# Patient Record
Sex: MALE | Race: WHITE | NOT HISPANIC OR LATINO | Employment: OTHER | URBAN - METROPOLITAN AREA
[De-identification: names, ages, dates, MRNs, and addresses within clinical notes are randomized per-mention and may not be internally consistent; named-entity substitution may affect disease eponyms.]

---

## 2018-02-12 ENCOUNTER — OFFICE VISIT CONVERTED (OUTPATIENT)
Dept: FAMILY MEDICINE CLINIC | Facility: CLINIC | Age: 38
End: 2018-02-12
Attending: NURSE PRACTITIONER

## 2018-08-16 ENCOUNTER — CONVERSION ENCOUNTER (OUTPATIENT)
Dept: FAMILY MEDICINE CLINIC | Facility: CLINIC | Age: 38
End: 2018-08-16

## 2018-08-16 ENCOUNTER — OFFICE VISIT CONVERTED (OUTPATIENT)
Dept: FAMILY MEDICINE CLINIC | Facility: CLINIC | Age: 38
End: 2018-08-16
Attending: FAMILY MEDICINE

## 2018-08-24 ENCOUNTER — OFFICE VISIT CONVERTED (OUTPATIENT)
Dept: FAMILY MEDICINE CLINIC | Facility: CLINIC | Age: 38
End: 2018-08-24
Attending: NURSE PRACTITIONER

## 2020-02-05 ENCOUNTER — HOSPITAL ENCOUNTER (OUTPATIENT)
Dept: FAMILY MEDICINE CLINIC | Facility: CLINIC | Age: 40
Discharge: HOME | End: 2020-02-05
Attending: NURSE PRACTITIONER

## 2020-02-05 ENCOUNTER — CONVERSION ENCOUNTER (OUTPATIENT)
Dept: FAMILY MEDICINE CLINIC | Facility: CLINIC | Age: 40
End: 2020-02-05

## 2020-02-05 ENCOUNTER — OFFICE VISIT CONVERTED (OUTPATIENT)
Dept: FAMILY MEDICINE CLINIC | Facility: CLINIC | Age: 40
End: 2020-02-05
Attending: NURSE PRACTITIONER

## 2020-02-05 LAB
ALBUMIN SERPL-MCNC: 4.3 G/DL (ref 3.5–5)
ALBUMIN/GLOB SERPL: 1.4 {RATIO} (ref 1.4–2.6)
ALP SERPL-CCNC: 90 U/L (ref 53–128)
ALT SERPL-CCNC: 41 U/L (ref 10–40)
ANION GAP SERPL CALC-SCNC: 19 MMOL/L (ref 8–19)
AST SERPL-CCNC: 22 U/L (ref 15–50)
BASOPHILS # BLD AUTO: 0.07 10*3/UL (ref 0–0.2)
BASOPHILS NFR BLD AUTO: 1.1 % (ref 0–3)
BILIRUB SERPL-MCNC: 0.5 MG/DL (ref 0.2–1.3)
BUN SERPL-MCNC: 8 MG/DL (ref 5–25)
BUN/CREAT SERPL: 8 {RATIO} (ref 6–20)
CALCIUM SERPL-MCNC: 9.9 MG/DL (ref 8.7–10.4)
CHLORIDE SERPL-SCNC: 104 MMOL/L (ref 99–111)
CHOLEST SERPL-MCNC: 207 MG/DL (ref 107–200)
CHOLEST/HDLC SERPL: 5.6 {RATIO} (ref 3–6)
CONV ABS IMM GRAN: 0.02 10*3/UL (ref 0–0.2)
CONV CO2: 23 MMOL/L (ref 22–32)
CONV IMMATURE GRAN: 0.3 % (ref 0–1.8)
CONV TOTAL PROTEIN: 7.4 G/DL (ref 6.3–8.2)
CREAT UR-MCNC: 0.97 MG/DL (ref 0.7–1.2)
DEPRECATED RDW RBC AUTO: 44.2 FL (ref 35.1–43.9)
EOSINOPHIL # BLD AUTO: 0.41 10*3/UL (ref 0–0.7)
EOSINOPHIL # BLD AUTO: 6.4 % (ref 0–7)
ERYTHROCYTE [DISTWIDTH] IN BLOOD BY AUTOMATED COUNT: 13.1 % (ref 11.6–14.4)
FOLATE SERPL-MCNC: 12.7 NG/ML (ref 4.8–20)
GFR SERPLBLD BASED ON 1.73 SQ M-ARVRAT: >60 ML/MIN/{1.73_M2}
GLOBULIN UR ELPH-MCNC: 3.1 G/DL (ref 2–3.5)
GLUCOSE SERPL-MCNC: 105 MG/DL (ref 70–99)
HCT VFR BLD AUTO: 50.5 % (ref 42–52)
HDLC SERPL-MCNC: 37 MG/DL (ref 40–60)
HGB BLD-MCNC: 16.1 G/DL (ref 14–18)
LDLC SERPL CALC-MCNC: 144 MG/DL (ref 70–100)
LYMPHOCYTES # BLD AUTO: 1.91 10*3/UL (ref 1–5)
LYMPHOCYTES NFR BLD AUTO: 29.6 % (ref 20–45)
MCH RBC QN AUTO: 29.3 PG (ref 27–31)
MCHC RBC AUTO-ENTMCNC: 31.9 G/DL (ref 33–37)
MCV RBC AUTO: 92 FL (ref 80–96)
MONOCYTES # BLD AUTO: 0.72 10*3/UL (ref 0.2–1.2)
MONOCYTES NFR BLD AUTO: 11.2 % (ref 3–10)
NEUTROPHILS # BLD AUTO: 3.32 10*3/UL (ref 2–8)
NEUTROPHILS NFR BLD AUTO: 51.4 % (ref 30–85)
NRBC CBCN: 0 % (ref 0–0.7)
OSMOLALITY SERPL CALC.SUM OF ELEC: 291 MOSM/KG (ref 273–304)
PLATELET # BLD AUTO: 281 10*3/UL (ref 130–400)
PMV BLD AUTO: 11.3 FL (ref 9.4–12.4)
POTASSIUM SERPL-SCNC: 4.8 MMOL/L (ref 3.5–5.3)
RBC # BLD AUTO: 5.49 10*6/UL (ref 4.7–6.1)
SODIUM SERPL-SCNC: 141 MMOL/L (ref 135–147)
TESTOST SERPL-MCNC: 317 NG/DL (ref 249–836)
TRIGL SERPL-MCNC: 128 MG/DL (ref 40–150)
TSH SERPL-ACNC: 1.86 M[IU]/L (ref 0.27–4.2)
VIT B12 SERPL-MCNC: 505 PG/ML (ref 211–911)
VLDLC SERPL-MCNC: 26 MG/DL (ref 5–37)
WBC # BLD AUTO: 6.45 10*3/UL (ref 4.8–10.8)

## 2020-03-11 ENCOUNTER — OFFICE VISIT CONVERTED (OUTPATIENT)
Dept: FAMILY MEDICINE CLINIC | Facility: CLINIC | Age: 40
End: 2020-03-11
Attending: NURSE PRACTITIONER

## 2020-09-01 ENCOUNTER — OFFICE VISIT CONVERTED (OUTPATIENT)
Dept: FAMILY MEDICINE CLINIC | Facility: CLINIC | Age: 40
End: 2020-09-01
Attending: NURSE PRACTITIONER

## 2021-02-10 ENCOUNTER — OFFICE VISIT CONVERTED (OUTPATIENT)
Dept: FAMILY MEDICINE CLINIC | Facility: CLINIC | Age: 41
End: 2021-02-10
Attending: NURSE PRACTITIONER

## 2021-02-10 ENCOUNTER — HOSPITAL ENCOUNTER (OUTPATIENT)
Dept: FAMILY MEDICINE CLINIC | Facility: CLINIC | Age: 41
Discharge: HOME | End: 2021-02-10
Attending: NURSE PRACTITIONER

## 2021-02-10 ENCOUNTER — CONVERSION ENCOUNTER (OUTPATIENT)
Dept: FAMILY MEDICINE CLINIC | Facility: CLINIC | Age: 41
End: 2021-02-10

## 2021-02-10 LAB
ALBUMIN SERPL-MCNC: 4.5 G/DL (ref 3.5–5)
ALBUMIN/GLOB SERPL: 1.6 {RATIO} (ref 1.4–2.6)
ALP SERPL-CCNC: 97 U/L (ref 53–128)
ALT SERPL-CCNC: 24 U/L (ref 10–40)
ANION GAP SERPL CALC-SCNC: 16 MMOL/L (ref 8–19)
AST SERPL-CCNC: 18 U/L (ref 15–50)
BASOPHILS # BLD AUTO: 0.06 10*3/UL (ref 0–0.2)
BASOPHILS NFR BLD AUTO: 0.9 % (ref 0–3)
BILIRUB SERPL-MCNC: 0.68 MG/DL (ref 0.2–1.3)
BUN SERPL-MCNC: 12 MG/DL (ref 5–25)
BUN/CREAT SERPL: 14 {RATIO} (ref 6–20)
CALCIUM SERPL-MCNC: 9.4 MG/DL (ref 8.7–10.4)
CHLORIDE SERPL-SCNC: 103 MMOL/L (ref 99–111)
CHOLEST SERPL-MCNC: 219 MG/DL (ref 107–200)
CHOLEST/HDLC SERPL: 4.8 {RATIO} (ref 3–6)
CONV ABS IMM GRAN: 0.02 10*3/UL (ref 0–0.2)
CONV CO2: 25 MMOL/L (ref 22–32)
CONV IMMATURE GRAN: 0.3 % (ref 0–1.8)
CONV TOTAL PROTEIN: 7.4 G/DL (ref 6.3–8.2)
CREAT UR-MCNC: 0.86 MG/DL (ref 0.7–1.2)
DEPRECATED RDW RBC AUTO: 42.5 FL (ref 35.1–43.9)
EOSINOPHIL # BLD AUTO: 0.28 10*3/UL (ref 0–0.7)
EOSINOPHIL # BLD AUTO: 4.1 % (ref 0–7)
ERYTHROCYTE [DISTWIDTH] IN BLOOD BY AUTOMATED COUNT: 13.2 % (ref 11.6–14.4)
FOLATE SERPL-MCNC: 10.4 NG/ML (ref 4.8–20)
GFR SERPLBLD BASED ON 1.73 SQ M-ARVRAT: >60 ML/MIN/{1.73_M2}
GLOBULIN UR ELPH-MCNC: 2.9 G/DL (ref 2–3.5)
GLUCOSE SERPL-MCNC: 106 MG/DL (ref 70–99)
HCT VFR BLD AUTO: 51.6 % (ref 42–52)
HDLC SERPL-MCNC: 46 MG/DL (ref 40–60)
HGB BLD-MCNC: 16.7 G/DL (ref 14–18)
LDLC SERPL CALC-MCNC: 155 MG/DL (ref 70–100)
LYMPHOCYTES # BLD AUTO: 1.69 10*3/UL (ref 1–5)
LYMPHOCYTES NFR BLD AUTO: 24.6 % (ref 20–45)
MCH RBC QN AUTO: 28.7 PG (ref 27–31)
MCHC RBC AUTO-ENTMCNC: 32.4 G/DL (ref 33–37)
MCV RBC AUTO: 88.7 FL (ref 80–96)
MONOCYTES # BLD AUTO: 0.74 10*3/UL (ref 0.2–1.2)
MONOCYTES NFR BLD AUTO: 10.8 % (ref 3–10)
NEUTROPHILS # BLD AUTO: 4.07 10*3/UL (ref 2–8)
NEUTROPHILS NFR BLD AUTO: 59.3 % (ref 30–85)
NRBC CBCN: 0 % (ref 0–0.7)
OSMOLALITY SERPL CALC.SUM OF ELEC: 288 MOSM/KG (ref 273–304)
PLATELET # BLD AUTO: 257 10*3/UL (ref 130–400)
PMV BLD AUTO: 11.4 FL (ref 9.4–12.4)
POTASSIUM SERPL-SCNC: 4.5 MMOL/L (ref 3.5–5.3)
RBC # BLD AUTO: 5.82 10*6/UL (ref 4.7–6.1)
SODIUM SERPL-SCNC: 139 MMOL/L (ref 135–147)
TESTOST SERPL-MCNC: 374 NG/DL (ref 249–836)
TRIGL SERPL-MCNC: 88 MG/DL (ref 40–150)
TSH SERPL-ACNC: 1.92 M[IU]/L (ref 0.27–4.2)
VIT B12 SERPL-MCNC: 543 PG/ML (ref 211–911)
VLDLC SERPL-MCNC: 18 MG/DL (ref 5–37)
WBC # BLD AUTO: 6.86 10*3/UL (ref 4.8–10.8)

## 2021-05-09 VITALS
DIASTOLIC BLOOD PRESSURE: 82 MMHG | TEMPERATURE: 97.5 F | OXYGEN SATURATION: 98 % | BODY MASS INDEX: 31.86 KG/M2 | SYSTOLIC BLOOD PRESSURE: 138 MMHG | WEIGHT: 256.25 LBS | HEART RATE: 99 BPM | HEIGHT: 75 IN

## 2021-05-09 VITALS
OXYGEN SATURATION: 99 % | WEIGHT: 267.5 LBS | HEART RATE: 78 BPM | HEIGHT: 75 IN | SYSTOLIC BLOOD PRESSURE: 120 MMHG | BODY MASS INDEX: 33.26 KG/M2 | TEMPERATURE: 97.4 F | DIASTOLIC BLOOD PRESSURE: 82 MMHG

## 2021-05-09 VITALS
DIASTOLIC BLOOD PRESSURE: 74 MMHG | SYSTOLIC BLOOD PRESSURE: 128 MMHG | HEART RATE: 69 BPM | BODY MASS INDEX: 32.22 KG/M2 | OXYGEN SATURATION: 98 % | HEIGHT: 75 IN | TEMPERATURE: 98.1 F | WEIGHT: 259.12 LBS

## 2021-05-09 VITALS
WEIGHT: 270 LBS | DIASTOLIC BLOOD PRESSURE: 80 MMHG | SYSTOLIC BLOOD PRESSURE: 120 MMHG | TEMPERATURE: 97.2 F | OXYGEN SATURATION: 98 % | HEART RATE: 83 BPM

## 2021-05-09 VITALS
HEART RATE: 100 BPM | DIASTOLIC BLOOD PRESSURE: 80 MMHG | TEMPERATURE: 97.3 F | WEIGHT: 282 LBS | HEIGHT: 75 IN | BODY MASS INDEX: 35.06 KG/M2 | SYSTOLIC BLOOD PRESSURE: 120 MMHG | OXYGEN SATURATION: 100 %

## 2021-05-09 VITALS
TEMPERATURE: 98 F | OXYGEN SATURATION: 98 % | WEIGHT: 271 LBS | DIASTOLIC BLOOD PRESSURE: 84 MMHG | SYSTOLIC BLOOD PRESSURE: 118 MMHG | HEART RATE: 82 BPM

## 2021-05-09 VITALS
DIASTOLIC BLOOD PRESSURE: 90 MMHG | DIASTOLIC BLOOD PRESSURE: 98 MMHG | WEIGHT: 275.37 LBS | HEART RATE: 72 BPM | TEMPERATURE: 97.4 F | SYSTOLIC BLOOD PRESSURE: 140 MMHG | BODY MASS INDEX: 34.24 KG/M2 | HEIGHT: 75 IN | OXYGEN SATURATION: 100 % | SYSTOLIC BLOOD PRESSURE: 138 MMHG

## 2021-12-01 ENCOUNTER — OFFICE VISIT (OUTPATIENT)
Dept: FAMILY MEDICINE CLINIC | Facility: CLINIC | Age: 41
End: 2021-12-01

## 2021-12-01 VITALS
HEIGHT: 76 IN | WEIGHT: 259 LBS | OXYGEN SATURATION: 98 % | TEMPERATURE: 97.3 F | SYSTOLIC BLOOD PRESSURE: 122 MMHG | HEART RATE: 73 BPM | DIASTOLIC BLOOD PRESSURE: 84 MMHG | BODY MASS INDEX: 31.54 KG/M2

## 2021-12-01 DIAGNOSIS — M50.30 DDD (DEGENERATIVE DISC DISEASE), CERVICAL: Primary | ICD-10-CM

## 2021-12-01 DIAGNOSIS — M79.672 LEFT FOOT PAIN: ICD-10-CM

## 2021-12-01 DIAGNOSIS — G89.29 CHRONIC MIDLINE LOW BACK PAIN WITHOUT SCIATICA: ICD-10-CM

## 2021-12-01 DIAGNOSIS — E78.5 DYSLIPIDEMIA: ICD-10-CM

## 2021-12-01 DIAGNOSIS — M54.50 CHRONIC MIDLINE LOW BACK PAIN WITHOUT SCIATICA: ICD-10-CM

## 2021-12-01 DIAGNOSIS — R73.09 ELEVATED GLUCOSE: ICD-10-CM

## 2021-12-01 DIAGNOSIS — J01.40 ACUTE NON-RECURRENT PANSINUSITIS: ICD-10-CM

## 2021-12-01 DIAGNOSIS — Z28.21 INFLUENZA VACCINATION DECLINED: ICD-10-CM

## 2021-12-01 PROBLEM — F41.9 ANXIETY: Status: ACTIVE | Noted: 2021-12-01

## 2021-12-01 PROBLEM — J45.909 ASTHMA: Status: ACTIVE | Noted: 2021-12-01

## 2021-12-01 PROBLEM — F32.A DEPRESSION: Status: ACTIVE | Noted: 2021-12-01

## 2021-12-01 PROBLEM — M19.90 ARTHRITIS: Status: ACTIVE | Noted: 2021-12-01

## 2021-12-01 LAB
ALBUMIN SERPL-MCNC: 4.7 G/DL (ref 3.5–5.2)
ALBUMIN/GLOB SERPL: 1.6 G/DL
ALP SERPL-CCNC: 85 U/L (ref 39–117)
ALT SERPL W P-5'-P-CCNC: 37 U/L (ref 1–41)
ANION GAP SERPL CALCULATED.3IONS-SCNC: 6.4 MMOL/L (ref 5–15)
AST SERPL-CCNC: 25 U/L (ref 1–40)
BILIRUB SERPL-MCNC: 0.6 MG/DL (ref 0–1.2)
BUN SERPL-MCNC: 13 MG/DL (ref 6–20)
BUN/CREAT SERPL: 15.3 (ref 7–25)
CALCIUM SPEC-SCNC: 9.7 MG/DL (ref 8.6–10.5)
CHLORIDE SERPL-SCNC: 105 MMOL/L (ref 98–107)
CHOLEST SERPL-MCNC: 193 MG/DL (ref 0–200)
CO2 SERPL-SCNC: 29.6 MMOL/L (ref 22–29)
CREAT SERPL-MCNC: 0.85 MG/DL (ref 0.76–1.27)
GFR SERPL CREATININE-BSD FRML MDRD: 100 ML/MIN/1.73
GLOBULIN UR ELPH-MCNC: 2.9 GM/DL
GLUCOSE SERPL-MCNC: 100 MG/DL (ref 65–99)
HBA1C MFR BLD: 5.4 % (ref 4.8–5.6)
HDLC SERPL-MCNC: 41 MG/DL (ref 40–60)
LDLC SERPL CALC-MCNC: 128 MG/DL (ref 0–100)
LDLC/HDLC SERPL: 3.05 {RATIO}
POTASSIUM SERPL-SCNC: 5 MMOL/L (ref 3.5–5.2)
PROT SERPL-MCNC: 7.6 G/DL (ref 6–8.5)
SODIUM SERPL-SCNC: 141 MMOL/L (ref 136–145)
TRIGL SERPL-MCNC: 135 MG/DL (ref 0–150)
URATE SERPL-MCNC: 6 MG/DL (ref 3.4–7)
VLDLC SERPL-MCNC: 24 MG/DL (ref 5–40)

## 2021-12-01 PROCEDURE — 99214 OFFICE O/P EST MOD 30 MIN: CPT | Performed by: NURSE PRACTITIONER

## 2021-12-01 PROCEDURE — 84550 ASSAY OF BLOOD/URIC ACID: CPT | Performed by: NURSE PRACTITIONER

## 2021-12-01 PROCEDURE — 80061 LIPID PANEL: CPT | Performed by: NURSE PRACTITIONER

## 2021-12-01 PROCEDURE — 83036 HEMOGLOBIN GLYCOSYLATED A1C: CPT | Performed by: NURSE PRACTITIONER

## 2021-12-01 PROCEDURE — 80053 COMPREHEN METABOLIC PANEL: CPT | Performed by: NURSE PRACTITIONER

## 2021-12-01 RX ORDER — TIZANIDINE 4 MG/1
TABLET ORAL
COMMUNITY
Start: 2021-11-12 | End: 2021-12-01 | Stop reason: SDUPTHER

## 2021-12-01 RX ORDER — METHYLPREDNISOLONE 4 MG/1
TABLET ORAL
Qty: 21 EACH | Refills: 0 | Status: SHIPPED | OUTPATIENT
Start: 2021-12-01

## 2021-12-01 RX ORDER — ALBUTEROL SULFATE 90 UG/1
2 AEROSOL, METERED RESPIRATORY (INHALATION) EVERY 4 HOURS PRN
COMMUNITY

## 2021-12-01 RX ORDER — DOXYCYCLINE HYCLATE 100 MG/1
100 CAPSULE ORAL 2 TIMES DAILY
Qty: 20 CAPSULE | Refills: 0 | Status: SHIPPED | OUTPATIENT
Start: 2021-12-01

## 2021-12-01 RX ORDER — TIZANIDINE 4 MG/1
4 TABLET ORAL NIGHTLY PRN
Qty: 30 TABLET | Refills: 5 | Status: SHIPPED | OUTPATIENT
Start: 2021-12-01

## 2021-12-01 NOTE — PROGRESS NOTES
Venipuncture Blood Specimen Collection  Venipuncture performed in left arm  by Delal Christianson with good hemostasis. Patient tolerated the procedure well without complications.   12/01/21   Della Christianson

## 2021-12-01 NOTE — PROGRESS NOTES
Chief Complaint  Med Refill (medicine refills,having blood pressure issues and his left foot having pains through the middle of it)    Subjective            Nabeel Fraser presents to [facility]  Pt is here today for the med refills on the Zanaflex for the DDD of cervical spine --pt does well no SE no issues--takes this only at night--    Also at last visit pt was having BP issues and then lined out--and normal today--and pt reports if drinks caffeinated drinks the BP will elevate--only drinks decaf coffee now     Hx of the HLD and was taking the fish oil--not been on it for 3 weeks--    GERD: stable off meds--pt sleeps with an HOB elevated     Asthma: stale off meds--     Pt quit smoking approx 13 yrs ago--    ___________________________________________    Declines vaccines today     ____________________________________________    Pt reports left foot with grinding sensation with stepping--and painful at times--then pt reports located at the bottom of foot at the 2nd and 3rd toe base --no injury --started prior to last visit in 2021--and not all the time--but bothering him now--pt rpeorts when it happens goes on for a few days     _____________________________________________    Also sinus infection s/s and tried OTC's and started approx 1.5 weeks ago --declines the COVID test       PHQ-2 Total Score: 0  PHQ-9 Total Score: 0    History reviewed. No pertinent past medical history.    Allergies   Allergen Reactions   • Cefdinir Unknown - High Severity   • Erythromycin Unknown - High Severity   • Penicillins Unknown - High Severity        History reviewed. No pertinent surgical history.     Social History     Tobacco Use   • Smoking status: Former Smoker     Types: Cigarettes     Quit date: 2009     Years since quittin.9   • Smokeless tobacco: Never Used   Vaping Use   • Vaping Use: Never used   Substance Use Topics   • Alcohol use: Not Currently   • Drug use: Defer  "      History reviewed. No pertinent family history.     Health Maintenance Due   Topic Date Due   • ANNUAL PHYSICAL  Never done   • COVID-19 Vaccine (1) Never done   • Pneumococcal Vaccine 0-64 (1 of 2 - PPSV23) Never done   • INFLUENZA VACCINE  08/01/2021   • HEPATITIS C SCREENING  Never done        Current Outpatient Medications on File Prior to Visit   Medication Sig   • albuterol sulfate  (90 Base) MCG/ACT inhaler Inhale 2 puffs Every 4 (Four) Hours As Needed.   • [DISCONTINUED] tiZANidine (ZANAFLEX) 4 MG tablet TAKE 1 TABLET BY MOUTH once DAILY IN THE EVENING AS NEEDED for ddd cervical spine     No current facility-administered medications on file prior to visit.         There is no immunization history on file for this patient.    Review of Systems   Constitutional: Negative for fatigue.   HENT: Positive for congestion and sinus pressure.         This started when traveling for the holidays and now persistent in the sinus' and pressure and pain   Eyes: Negative.    Respiratory: Negative.  Negative for chest tightness, shortness of breath and wheezing.    Cardiovascular: Negative.    Gastrointestinal: Negative for abdominal pain, blood in stool and GERD.   Endocrine: Negative for polydipsia, polyphagia and polyuria.   Genitourinary: Negative for dysuria.   Musculoskeletal: Positive for arthralgias, back pain, myalgias and neck pain.        Chronic and Hx of DDD and uses zanaflex --also reports more recently hand stiffness --and as per HPI   Skin: Negative.    Allergic/Immunologic: Positive for environmental allergies. Negative for food allergies.   Neurological: Positive for headache. Negative for dizziness, tremors, seizures, syncope, light-headedness and numbness.   Hematological: Does not bruise/bleed easily.   Psychiatric/Behavioral: Negative for self-injury, suicidal ideas and depressed mood. The patient is nervous/anxious.         Pt reports had this since childhood and then reports \"just deals " "with it\"         Objective     /84 (BP Location: Right arm, Patient Position: Sitting, Cuff Size: Adult)   Pulse 73   Temp 97.3 °F (36.3 °C) (Temporal)   Ht 193 cm (76\")   Wt 117 kg (259 lb)   SpO2 98%   BMI 31.53 kg/m²       Physical Exam  Vitals and nursing note reviewed.   Constitutional:       Appearance: Normal appearance.   HENT:      Head: Normocephalic.      Right Ear: Tympanic membrane, ear canal and external ear normal.      Left Ear: Tympanic membrane, ear canal and external ear normal.      Nose:      Right Sinus: Maxillary sinus tenderness and frontal sinus tenderness present.      Left Sinus: Maxillary sinus tenderness and frontal sinus tenderness present.      Mouth/Throat:      Mouth: Mucous membranes are moist.      Pharynx: Posterior oropharyngeal erythema present.   Eyes:      Pupils: Pupils are equal, round, and reactive to light.   Cardiovascular:      Rate and Rhythm: Normal rate and regular rhythm.      Heart sounds: Normal heart sounds.   Pulmonary:      Effort: Pulmonary effort is normal.      Breath sounds: Normal breath sounds.   Abdominal:      General: Bowel sounds are normal.      Palpations: Abdomen is soft.      Tenderness: There is no abdominal tenderness.   Musculoskeletal:      Cervical back: Normal range of motion and neck supple. Spasms, tenderness, bony tenderness and crepitus present.      Lumbar back: Decreased range of motion.   Feet:      Comments: Left foot--bottom of the foot--at the base of the 2nd and 3rd toes puffy and tenderness to palpation   Skin:     General: Skin is warm and dry.   Neurological:      Mental Status: He is alert and oriented to person, place, and time.   Psychiatric:         Mood and Affect: Mood normal.         Behavior: Behavior normal.         Judgment: Judgment normal.         Result Review :                          Assessment and Plan      Diagnoses and all orders for this visit:    1. DDD (degenerative disc disease), cervical " (Primary)  -     tiZANidine (ZANAFLEX) 4 MG tablet; Take 1 tablet by mouth At Night As Needed for Muscle Spasms.  Dispense: 30 tablet; Refill: 5    2. Chronic midline low back pain without sciatica  -     tiZANidine (ZANAFLEX) 4 MG tablet; Take 1 tablet by mouth At Night As Needed for Muscle Spasms.  Dispense: 30 tablet; Refill: 5    3. Dyslipidemia  -     Lipid Panel    4. Elevated glucose  -     Comprehensive Metabolic Panel  -     Hemoglobin A1c    5. Left foot pain  -     XR Foot 3+ View Left (In Office)  -     Uric Acid    6. Acute non-recurrent pansinusitis  -     methylPREDNISolone (MEDROL) 4 MG dose pack; Take as directed on package instructions.  Dispense: 21 each; Refill: 0  -     doxycycline (VIBRAMYCIN) 100 MG capsule; Take 1 capsule by mouth 2 (Two) Times a Day.  Dispense: 20 capsule; Refill: 0    7. Influenza vaccination declined    Also declines any Covid testing today    And declines pneumonia vaccine        Follow Up     Return in about 6 months (around 6/1/2022), or if symptoms worsen or fail to improve.

## 2021-12-01 NOTE — PROGRESS NOTES
Please mail letter to patient stating    Nabeel, the x-ray of your left foot was completely normal if the pain persists after using the steroids please let me know and I can refer you to a podiatrist

## 2021-12-02 NOTE — PROGRESS NOTES
Please mail letter to patient stating    Nabeel, your cholesterol levels were far better than they were the last 2 times that we have drawn them the total cholesterol triglyceride levels HDL levels were all in normal range and then the LDL was down to 128 and that is down from 155 the last time and it should be less than 100 when fasting so please continue a low-cholesterol diet and getting some exercise on a daily basis    Your comprehensive panel shows normal glucose normal kidney and liver function test and normal electrolytes and the uric acid level was in normal range -also the hemoglobin A1c was in normal range no signs of prediabetes

## 2023-08-15 ENCOUNTER — OFFICE VISIT (OUTPATIENT)
Dept: GASTROENTEROLOGY | Facility: CLINIC | Age: 43
End: 2023-08-15
Payer: COMMERCIAL

## 2023-08-15 VITALS
WEIGHT: 281.6 LBS | HEIGHT: 77 IN | BODY MASS INDEX: 33.25 KG/M2 | DIASTOLIC BLOOD PRESSURE: 85 MMHG | HEART RATE: 55 BPM | SYSTOLIC BLOOD PRESSURE: 144 MMHG

## 2023-08-15 DIAGNOSIS — K21.9 GASTROESOPHAGEAL REFLUX DISEASE, UNSPECIFIED WHETHER ESOPHAGITIS PRESENT: ICD-10-CM

## 2023-08-15 DIAGNOSIS — R09.89 GLOBUS SENSATION: ICD-10-CM

## 2023-08-15 DIAGNOSIS — R10.12 LEFT UPPER QUADRANT ABDOMINAL PAIN: Primary | ICD-10-CM

## 2023-08-15 DIAGNOSIS — Z80.0 FH: COLON CANCER: ICD-10-CM

## 2023-08-15 DIAGNOSIS — K58.0 IRRITABLE BOWEL SYNDROME WITH DIARRHEA: ICD-10-CM

## 2023-08-15 PROCEDURE — 1159F MED LIST DOCD IN RCRD: CPT | Performed by: NURSE PRACTITIONER

## 2023-08-15 PROCEDURE — 1160F RVW MEDS BY RX/DR IN RCRD: CPT | Performed by: NURSE PRACTITIONER

## 2023-08-15 PROCEDURE — 99204 OFFICE O/P NEW MOD 45 MIN: CPT | Performed by: NURSE PRACTITIONER

## 2023-08-15 RX ORDER — ATENOLOL 25 MG/1
1 TABLET ORAL DAILY
COMMUNITY
Start: 2023-08-11

## 2023-08-15 RX ORDER — TESTOSTERONE CYPIONATE 200 MG/ML
INJECTION, SOLUTION INTRAMUSCULAR
COMMUNITY
Start: 2023-07-11

## 2023-08-15 RX ORDER — GEMFIBROZIL 600 MG/1
TABLET, FILM COATED ORAL
COMMUNITY
Start: 2023-07-11

## 2023-08-15 RX ORDER — POLYETHYLENE GLYCOL 3350, SODIUM CHLORIDE, SODIUM BICARBONATE, POTASSIUM CHLORIDE 420; 11.2; 5.72; 1.48 G/4L; G/4L; G/4L; G/4L
4000 POWDER, FOR SOLUTION ORAL ONCE
Qty: 4000 ML | Refills: 0 | Status: SHIPPED | OUTPATIENT
Start: 2023-08-15 | End: 2023-08-15

## 2023-08-15 NOTE — PROGRESS NOTES
Chief Complaint        Abdominal Pain (LUQ)    History of Present Illness      Nabeel Fraser is a 43 y.o. male who presents to [facility] as a new patient for abdominal pain.    EGD/colonoscopy---In [place] several years ago. Both were normal per patient report. Hx hemorrhoidectomy. Has been treated for IBS in the past.     Reports intermittent issues with LUQ abd pain. He wakes up a lot with acid in his throat. He admits sometimes anything he drinks will bother him. He does have a BM every morning. He will have fecal urgency with loose stools on occasion. He doesn't take anything regularly for the loose stools because then he will get backed up. He denies any rectal bleeding or melena.  He admits he has tried several over-the-counter meds for reflux in the past.  Nothing is ever really seem to work.  Currently he will just drink milk until it helps.    GI family history----Father with colon cancer with METS to the stomach.     He does admit he was a heavy drinker when he was younger. He does not drink now. He does not smoke. Denies any recreational drug use.         Results       Result Review :   The following data was reviewed by: FERNANDA Luu on 08/15/2023                      Past Medical History       Past Medical History:   Diagnosis Date    Asthma     Degenerative disc disease, cervical     Hyperlipidemia     Hypertension        Past Surgical History:   Procedure Laterality Date    COLONOSCOPY      TESTICLE UNDESCENDED REPAIR      UPPER GASTROINTESTINAL ENDOSCOPY           Current Outpatient Medications:     albuterol sulfate  (90 Base) MCG/ACT inhaler, Inhale 2 puffs Every 4 (Four) Hours As Needed., Disp: , Rfl:     atenolol (TENORMIN) 25 MG tablet, Take 1 tablet by mouth Daily., Disp: , Rfl:     gemfibrozil (LOPID) 600 MG tablet, TAKE 1 TABLET BY MOUTH TWICE DAILY, 30 MINUTES BEFORE MORNING AND EVENING MEALS, Disp: , Rfl:     Testosterone  "Cypionate (DEPOTESTOTERONE CYPIONATE) 200 MG/ML injection, inject 0.5 ml INTRAMUSCULARLY once weekly (discard vial once dose is drawn- vial is only intended to be punctured once), Disp: , Rfl:     tiZANidine (ZANAFLEX) 4 MG tablet, Take 1 tablet by mouth At Night As Needed for Muscle Spasms., Disp: 30 tablet, Rfl: 5    polyethylene glycol-electrolytes (Nulytely with Flavor Packs) 420 g solution, Take 4,000 mL by mouth 1 (One) Time for 1 dose., Disp: 4000 mL, Rfl: 0     Allergies   Allergen Reactions    Cefdinir Unknown - High Severity    Erythromycin Unknown - High Severity    Penicillins Unknown - High Severity       Family History   Problem Relation Age of Onset    Stomach cancer Father     Colon cancer Father         Social History     Social History Narrative    Not on file       Objective       Objective     Vital Signs:   /85 (BP Location: Right arm, Patient Position: Sitting, Cuff Size: Adult)   Pulse 55   Ht 195.6 cm (77\")   Wt 128 kg (281 lb 9.6 oz)   BMI 33.39 kg/mý     Body mass index is 33.39 kg/mý.    Review of Systems   Constitutional:  Negative for appetite change, chills, diaphoresis, fatigue, fever and unexpected weight change.   HENT:  Negative for nosebleeds, postnasal drip, sore throat, trouble swallowing and voice change.    Respiratory:  Negative for cough, choking, chest tightness, shortness of breath, wheezing and stridor.    Cardiovascular:  Negative for chest pain, palpitations and leg swelling.   Gastrointestinal:  Positive for abdominal distention, abdominal pain and diarrhea. Negative for anal bleeding, blood in stool, constipation, nausea, rectal pain and vomiting.   Endocrine: Negative for polydipsia, polyphagia and polyuria.   Musculoskeletal:  Negative for gait problem.   Skin:  Negative for rash and wound.   Allergic/Immunologic: Negative for food allergies.   Neurological:  Negative for dizziness, speech difficulty and light-headedness.   Psychiatric/Behavioral:  Negative " for confusion, self-injury, sleep disturbance and suicidal ideas.       Physical Exam  Constitutional:       General: He is not in acute distress.     Appearance: He is well-developed. He is not ill-appearing.   HENT:      Head: Normocephalic.   Eyes:      Pupils: Pupils are equal, round, and reactive to light.   Cardiovascular:      Rate and Rhythm: Normal rate and regular rhythm.      Heart sounds: Normal heart sounds.   Pulmonary:      Effort: Pulmonary effort is normal.      Breath sounds: Normal breath sounds.   Abdominal:      General: Bowel sounds are normal. There is distension.      Palpations: Abdomen is soft. There is no mass.      Tenderness: There is no abdominal tenderness. There is no guarding or rebound.      Hernia: No hernia is present.   Musculoskeletal:         General: Normal range of motion.   Skin:     General: Skin is warm and dry.   Neurological:      Mental Status: He is alert and oriented to person, place, and time.   Psychiatric:         Speech: Speech normal.         Behavior: Behavior normal.         Judgment: Judgment normal.            Assessment & Plan          Assessment and Plan    Diagnoses and all orders for this visit:    1. Left upper quadrant abdominal pain (Primary)  -     Case Request; Standing  -     Follow Anesthesia Guidelines / Protocol; Future  -     Case Request  -     polyethylene glycol-electrolytes (Nulytely with Flavor Packs) 420 g solution; Take 4,000 mL by mouth 1 (One) Time for 1 dose.  Dispense: 4000 mL; Refill: 0    2. Gastroesophageal reflux disease, unspecified whether esophagitis present  -     Case Request; Standing  -     Follow Anesthesia Guidelines / Protocol; Future  -     Case Request  -     polyethylene glycol-electrolytes (Nulytely with Flavor Packs) 420 g solution; Take 4,000 mL by mouth 1 (One) Time for 1 dose.  Dispense: 4000 mL; Refill: 0    3. Globus sensation  -     Case Request; Standing  -     Follow Anesthesia Guidelines / Protocol;  Future  -     Case Request  -     polyethylene glycol-electrolytes (Nulytely with Flavor Packs) 420 g solution; Take 4,000 mL by mouth 1 (One) Time for 1 dose.  Dispense: 4000 mL; Refill: 0    4. Irritable bowel syndrome with diarrhea  -     Case Request; Standing  -     Follow Anesthesia Guidelines / Protocol; Future  -     Case Request  -     polyethylene glycol-electrolytes (Nulytely with Flavor Packs) 420 g solution; Take 4,000 mL by mouth 1 (One) Time for 1 dose.  Dispense: 4000 mL; Refill: 0    5. FH: colon cancer  -     Case Request; Standing  -     Follow Anesthesia Guidelines / Protocol; Future  -     Case Request  -     polyethylene glycol-electrolytes (Nulytely with Flavor Packs) 420 g solution; Take 4,000 mL by mouth 1 (One) Time for 1 dose.  Dispense: 4000 mL; Refill: 0    Other orders  -     Verify NPO; Standing  -     Verify Bowel Prep Was Successful; Standing  -     Give Tap Water Enema If Bowel Prep Insufficient; Standing      Reviewed medical history with him today.  Given his history and current symptoms recommend EGD and colonoscopy with Dr. Funez for further evaluation.  Patient is agreeable to the scopes.  Continue GERD precautions.  Patient to call the office with any issues.  Patient to follow-up with me after his scopes.  Patient is agreeable to the plan.    Surgical Risk and Benefits discussed: Possible risks/complications, benefits, and alternatives to surgical or invasive procedure have been explained to patient and/or legal guardian; risks include bleeding, infection, and perforation. Patient has been evaluated and can tolerate anesthesia and/or sedation. Risks, benefits, and alternatives to anesthesia and sedation have been explained to patient and/or legal guardian.          Follow Up       Follow Up   Return for F/U AFTER PROCEDURE.  Patient was given instructions and counseling regarding his condition or for health maintenance advice. Please see specific information pulled into  the AVS if appropriate.

## 2023-08-16 ENCOUNTER — PATIENT ROUNDING (BHMG ONLY) (OUTPATIENT)
Dept: GASTROENTEROLOGY | Facility: CLINIC | Age: 43
End: 2023-08-16
Payer: COMMERCIAL

## 2023-08-16 NOTE — PROGRESS NOTES
8/16/2023      Hello, may I speak with Nabeel Fraser     My name is Lambert. I am calling from [facility]. I show that you had a recent visit with FERNANDA Auguste.    Before we get started may I verify your date of birth? 1980    I am calling to officially welcome you to our practice and ask about your recent visit. Is this a good time to talk? No I left patient a voicemail.     Tell me about your visit with us. What things went well?    We strive to ensure that we protect your safety and privacy. Is there anything we could have done to improve this during your visit?        We're always looking for ways to make our patients' experiences even better. Do you have recommendations on ways we may improve?    Overall were you satisfied with your first visit to our practice?    I appreciate you taking the time to speak with me today. Is there anything else I can do for you?    I am glad to hear that you had a very good visit and I appreciate you taking the time to provide feedback on this call. We would greatly appreciate you filling out a survey if you receive one in the mail, email or text. This is a great opportunity to provide any additional feedback that you may think of after this call as well.       Thank you, and have a great day.

## 2023-10-21 ENCOUNTER — ANESTHESIA EVENT (OUTPATIENT)
Dept: GASTROENTEROLOGY | Facility: HOSPITAL | Age: 43
End: 2023-10-21
Payer: COMMERCIAL

## 2023-10-23 NOTE — ANESTHESIA PREPROCEDURE EVALUATION
Anesthesia Evaluation     Patient summary reviewed and Nursing notes reviewed   NPO Solid Status: > 8 hours  NPO Liquid Status: > 2 hours           Airway   Mallampati: I  TM distance: >3 FB  Neck ROM: full  No difficulty expected  Dental - normal exam     Pulmonary     breath sounds clear to auscultation  (+) asthma (Used albuterol this am),sleep apnea  Cardiovascular     Rhythm: regular  Rate: normal    (+) hypertension, hyperlipidemia      Neuro/Psych  (+) psychiatric history Depression and Anxiety  GI/Hepatic/Renal/Endo    (+) GERD poorly controlled    Musculoskeletal     (+) back pain (chronic lower back pain/sciatica)  Abdominal    Substance History      OB/GYN          Other   arthritis,                       Anesthesia Plan    ASA 3     general   total IV anesthesia  (Total IV Anesthesia    Patient understands anesthesia not responsible for dental damage.  )  intravenous induction     Anesthetic plan, risks, benefits, and alternatives have been provided, discussed and informed consent has been obtained with: patient and spouse/significant other.    Plan discussed with CRNA.        CODE STATUS:

## 2023-10-24 ENCOUNTER — ANESTHESIA (OUTPATIENT)
Dept: GASTROENTEROLOGY | Facility: HOSPITAL | Age: 43
End: 2023-10-24
Payer: COMMERCIAL

## 2023-10-24 ENCOUNTER — HOSPITAL ENCOUNTER (OUTPATIENT)
Facility: HOSPITAL | Age: 43
Setting detail: HOSPITAL OUTPATIENT SURGERY
Discharge: HOME | End: 2023-10-24
Attending: INTERNAL MEDICINE | Admitting: INTERNAL MEDICINE
Payer: COMMERCIAL

## 2023-10-24 VITALS
HEART RATE: 75 BPM | OXYGEN SATURATION: 97 % | WEIGHT: 280.87 LBS | RESPIRATION RATE: 20 BRPM | HEIGHT: 77 IN | TEMPERATURE: 98.7 F | DIASTOLIC BLOOD PRESSURE: 78 MMHG | BODY MASS INDEX: 33.16 KG/M2 | SYSTOLIC BLOOD PRESSURE: 126 MMHG

## 2023-10-24 DIAGNOSIS — K21.9 GASTROESOPHAGEAL REFLUX DISEASE, UNSPECIFIED WHETHER ESOPHAGITIS PRESENT: ICD-10-CM

## 2023-10-24 DIAGNOSIS — R10.12 LEFT UPPER QUADRANT ABDOMINAL PAIN: ICD-10-CM

## 2023-10-24 DIAGNOSIS — K58.0 IRRITABLE BOWEL SYNDROME WITH DIARRHEA: ICD-10-CM

## 2023-10-24 DIAGNOSIS — Z80.0 FH: COLON CANCER: ICD-10-CM

## 2023-10-24 PROCEDURE — 25810000003 LACTATED RINGERS PER 1000 ML: Performed by: NURSE ANESTHETIST, CERTIFIED REGISTERED

## 2023-10-24 PROCEDURE — 45385 COLONOSCOPY W/LESION REMOVAL: CPT | Performed by: INTERNAL MEDICINE

## 2023-10-24 PROCEDURE — 43239 EGD BIOPSY SINGLE/MULTIPLE: CPT | Performed by: INTERNAL MEDICINE

## 2023-10-24 PROCEDURE — 88305 TISSUE EXAM BY PATHOLOGIST: CPT | Performed by: INTERNAL MEDICINE

## 2023-10-24 PROCEDURE — 25010000002 PROPOFOL 10 MG/ML EMULSION: Performed by: NURSE ANESTHETIST, CERTIFIED REGISTERED

## 2023-10-24 PROCEDURE — 45380 COLONOSCOPY AND BIOPSY: CPT | Performed by: INTERNAL MEDICINE

## 2023-10-24 RX ORDER — LIDOCAINE HYDROCHLORIDE 20 MG/ML
INJECTION, SOLUTION EPIDURAL; INFILTRATION; INTRACAUDAL; PERINEURAL AS NEEDED
Status: DISCONTINUED | OUTPATIENT
Start: 2023-10-24 | End: 2023-10-24 | Stop reason: SURG

## 2023-10-24 RX ORDER — PROPOFOL 10 MG/ML
VIAL (ML) INTRAVENOUS AS NEEDED
Status: DISCONTINUED | OUTPATIENT
Start: 2023-10-24 | End: 2023-10-24 | Stop reason: SURG

## 2023-10-24 RX ORDER — PANTOPRAZOLE SODIUM 20 MG/1
20 TABLET, DELAYED RELEASE ORAL DAILY
Qty: 30 TABLET | Refills: 1 | Status: SHIPPED | OUTPATIENT
Start: 2023-10-24

## 2023-10-24 RX ORDER — SODIUM CHLORIDE, SODIUM LACTATE, POTASSIUM CHLORIDE, CALCIUM CHLORIDE 600; 310; 30; 20 MG/100ML; MG/100ML; MG/100ML; MG/100ML
30 INJECTION, SOLUTION INTRAVENOUS CONTINUOUS
Status: DISCONTINUED | OUTPATIENT
Start: 2023-10-24 | End: 2023-10-24 | Stop reason: HOSPADM

## 2023-10-24 RX ADMIN — LIDOCAINE HYDROCHLORIDE 50 MG: 20 INJECTION, SOLUTION EPIDURAL; INFILTRATION; INTRACAUDAL; PERINEURAL at 09:08

## 2023-10-24 RX ADMIN — PROPOFOL 200 MCG/KG/MIN: 10 INJECTION, EMULSION INTRAVENOUS at 09:08

## 2023-10-24 RX ADMIN — PROPOFOL 200 MG: 10 INJECTION, EMULSION INTRAVENOUS at 09:08

## 2023-10-24 RX ADMIN — PROPOFOL 100 MG: 10 INJECTION, EMULSION INTRAVENOUS at 09:21

## 2023-10-24 RX ADMIN — SODIUM CHLORIDE, POTASSIUM CHLORIDE, SODIUM LACTATE AND CALCIUM CHLORIDE 30 ML/HR: 600; 310; 30; 20 INJECTION, SOLUTION INTRAVENOUS at 08:26

## 2023-10-24 NOTE — H&P
Pre Procedure History & Physical    Chief Complaint:   GERD, globus, LUQ pain, diarrhea    Subjective     HPI:   44 yo M here for eval of GERD, globus, LUQ pain, diarrhea.    Past Medical History:   Past Medical History:   Diagnosis Date    Asthma     Degenerative disc disease, cervical     Hyperlipidemia     Hypertension        Past Surgical History:  Past Surgical History:   Procedure Laterality Date    COLONOSCOPY      TESTICLE UNDESCENDED REPAIR      UPPER GASTROINTESTINAL ENDOSCOPY         Family History:  Family History   Problem Relation Age of Onset    Stomach cancer Father     Colon cancer Father        Social History:   reports that he quit smoking about 14 years ago. His smoking use included cigarettes. He has never used smokeless tobacco. He reports that he does not currently use alcohol. Drug use questions deferred to the physician.    Medications:   Medications Prior to Admission   Medication Sig Dispense Refill Last Dose    albuterol sulfate  (90 Base) MCG/ACT inhaler Inhale 2 puffs Every 4 (Four) Hours As Needed.       atenolol (TENORMIN) 25 MG tablet Take 1 tablet by mouth Daily.       gemfibrozil (LOPID) 600 MG tablet TAKE 1 TABLET BY MOUTH TWICE DAILY, 30 MINUTES BEFORE MORNING AND EVENING MEALS       Testosterone Cypionate (DEPOTESTOTERONE CYPIONATE) 200 MG/ML injection inject 0.5 ml INTRAMUSCULARLY once weekly (discard vial once dose is drawn- vial is only intended to be punctured once)       tiZANidine (ZANAFLEX) 4 MG tablet Take 1 tablet by mouth At Night As Needed for Muscle Spasms. 30 tablet 5        Allergies:  Cefdinir, Erythromycin, and Penicillins    ROS:    Pertinent items are noted in HPI     Objective     Weight 127 kg (280 lb 13.9 oz).    Physical Exam   Constitutional: Pt is oriented to person, place, and time and well-developed, well-nourished, and in no distress.   Mouth/Throat: Oropharynx is clear and moist.   Neck: Normal range of motion.   Cardiovascular: Normal rate,  regular rhythm and normal heart sounds.    Pulmonary/Chest: Effort normal and breath sounds normal.   Abdominal: Soft. Nontender  Skin: Skin is warm and dry.   Psychiatric: Mood, memory, affect and judgment normal.     Assessment & Plan     Diagnosis:  GERD, globus, LUQ pain, diarrhea    Anticipated Surgical Procedure:  EGD/colonoscopy    The risks, benefits, and alternatives of this procedure have been discussed with the patient or the responsible party- the patient understands and agrees to proceed.

## 2023-10-24 NOTE — ANESTHESIA POSTPROCEDURE EVALUATION
Patient: Nabeel Fraser    Procedure Summary       Date: 10/24/23 Room / Location: [facility] ENDOSCOPY 1 / [facility]    Anesthesia Start: 0908 Anesthesia Stop: 0947    Procedures:       ESOPHAGOGASTRODUODENOSCOPY with biopsy      COLONOSCOPY with biopsy and cold snare polypecotmy Diagnosis:       Left upper quadrant abdominal pain      Gastroesophageal reflux disease, unspecified whether esophagitis present      Irritable bowel syndrome with diarrhea      FH: colon cancer      (Left upper quadrant abdominal pain [R10.12])      (Gastroesophageal reflux disease, unspecified whether esophagitis present [K21.9])      (Irritable bowel syndrome with diarrhea [K58.0])      (FH: colon cancer [Z80.0])    Surgeons: Sarah Funez MD Provider: Gladis Thurston CRNA    Anesthesia Type: general ASA Status: 3            Anesthesia Type: general    Vitals  Vitals Value Taken Time   /85 10/24/23 0947   Temp     Pulse 76 10/24/23 0950   Resp     SpO2 96 % 10/24/23 0950   Vitals shown include unfiled device data.        Post Anesthesia Care and Evaluation    Post-procedure mental status: acceptable.  Pain management: satisfactory to patient    Airway patency: patent  Anesthetic complications: No anesthetic complications    Cardiovascular status: acceptable  Respiratory status: acceptable    Comments: Per chart review

## 2023-10-25 ENCOUNTER — HOSPITAL ENCOUNTER (EMERGENCY)
Facility: HOSPITAL | Age: 43
Discharge: HOME | End: 2023-10-25
Attending: EMERGENCY MEDICINE
Payer: COMMERCIAL

## 2023-10-25 VITALS
HEIGHT: 77 IN | SYSTOLIC BLOOD PRESSURE: 135 MMHG | BODY MASS INDEX: 31.21 KG/M2 | HEART RATE: 89 BPM | TEMPERATURE: 98.5 F | RESPIRATION RATE: 18 BRPM | WEIGHT: 264.33 LBS | DIASTOLIC BLOOD PRESSURE: 85 MMHG | OXYGEN SATURATION: 96 %

## 2023-10-25 DIAGNOSIS — R19.5 DARK STOOLS: Primary | ICD-10-CM

## 2023-10-25 LAB
ALBUMIN SERPL-MCNC: 4.4 G/DL (ref 3.5–5.2)
ALBUMIN/GLOB SERPL: 1.4 G/DL
ALP SERPL-CCNC: 72 U/L (ref 39–117)
ALT SERPL W P-5'-P-CCNC: 25 U/L (ref 1–41)
ANION GAP SERPL CALCULATED.3IONS-SCNC: 11.8 MMOL/L (ref 5–15)
AST SERPL-CCNC: 16 U/L (ref 1–40)
BASOPHILS # BLD AUTO: 0.05 10*3/MM3 (ref 0–0.2)
BASOPHILS NFR BLD AUTO: 0.5 % (ref 0–1.5)
BILIRUB SERPL-MCNC: 1 MG/DL (ref 0–1.2)
BUN SERPL-MCNC: 27 MG/DL (ref 6–20)
BUN/CREAT SERPL: 32.5 (ref 7–25)
CALCIUM SPEC-SCNC: 9.7 MG/DL (ref 8.6–10.5)
CHLORIDE SERPL-SCNC: 101 MMOL/L (ref 98–107)
CO2 SERPL-SCNC: 26.2 MMOL/L (ref 22–29)
CREAT SERPL-MCNC: 0.83 MG/DL (ref 0.76–1.27)
CYTO UR: NORMAL
DEPRECATED RDW RBC AUTO: 43.7 FL (ref 37–54)
EGFRCR SERPLBLD CKD-EPI 2021: 112.8 ML/MIN/1.73
EOSINOPHIL # BLD AUTO: 0.25 10*3/MM3 (ref 0–0.4)
EOSINOPHIL NFR BLD AUTO: 2.5 % (ref 0.3–6.2)
ERYTHROCYTE [DISTWIDTH] IN BLOOD BY AUTOMATED COUNT: 13.7 % (ref 12.3–15.4)
GLOBULIN UR ELPH-MCNC: 3.2 GM/DL
GLUCOSE SERPL-MCNC: 103 MG/DL (ref 65–99)
HCT VFR BLD AUTO: 48 % (ref 37.5–51)
HEMOCCULT STL QL IA: POSITIVE
HGB BLD-MCNC: 15.8 G/DL (ref 13–17.7)
HOLD SPECIMEN: NORMAL
HOLD SPECIMEN: NORMAL
IMM GRANULOCYTES # BLD AUTO: 0.04 10*3/MM3 (ref 0–0.05)
IMM GRANULOCYTES NFR BLD AUTO: 0.4 % (ref 0–0.5)
LAB AP CASE REPORT: NORMAL
LAB AP CLINICAL INFORMATION: NORMAL
LYMPHOCYTES # BLD AUTO: 1.82 10*3/MM3 (ref 0.7–3.1)
LYMPHOCYTES NFR BLD AUTO: 18.5 % (ref 19.6–45.3)
MCH RBC QN AUTO: 28.8 PG (ref 26.6–33)
MCHC RBC AUTO-ENTMCNC: 32.9 G/DL (ref 31.5–35.7)
MCV RBC AUTO: 87.6 FL (ref 79–97)
MONOCYTES # BLD AUTO: 1.05 10*3/MM3 (ref 0.1–0.9)
MONOCYTES NFR BLD AUTO: 10.6 % (ref 5–12)
NEUTROPHILS NFR BLD AUTO: 6.65 10*3/MM3 (ref 1.7–7)
NEUTROPHILS NFR BLD AUTO: 67.5 % (ref 42.7–76)
NRBC BLD AUTO-RTO: 0 /100 WBC (ref 0–0.2)
PATH REPORT.FINAL DX SPEC: NORMAL
PATH REPORT.GROSS SPEC: NORMAL
PLATELET # BLD AUTO: 273 10*3/MM3 (ref 140–450)
PMV BLD AUTO: 10.2 FL (ref 6–12)
POTASSIUM SERPL-SCNC: 3.2 MMOL/L (ref 3.5–5.2)
PROT SERPL-MCNC: 7.6 G/DL (ref 6–8.5)
RBC # BLD AUTO: 5.48 10*6/MM3 (ref 4.14–5.8)
SODIUM SERPL-SCNC: 139 MMOL/L (ref 136–145)
WBC NRBC COR # BLD: 9.86 10*3/MM3 (ref 3.4–10.8)
WHOLE BLOOD HOLD COAG: NORMAL
WHOLE BLOOD HOLD SPECIMEN: NORMAL

## 2023-10-25 PROCEDURE — 85025 COMPLETE CBC W/AUTO DIFF WBC: CPT

## 2023-10-25 PROCEDURE — 80053 COMPREHEN METABOLIC PANEL: CPT

## 2023-10-25 PROCEDURE — 82274 ASSAY TEST FOR BLOOD FECAL: CPT

## 2023-10-25 PROCEDURE — 36415 COLL VENOUS BLD VENIPUNCTURE: CPT

## 2023-10-25 PROCEDURE — 99283 EMERGENCY DEPT VISIT LOW MDM: CPT

## 2023-10-25 RX ORDER — SODIUM CHLORIDE 0.9 % (FLUSH) 0.9 %
10 SYRINGE (ML) INJECTION AS NEEDED
Status: DISCONTINUED | OUTPATIENT
Start: 2023-10-25 | End: 2023-10-25 | Stop reason: HOSPADM

## 2023-10-25 NOTE — SIGNIFICANT NOTE
"Patient stated he was having \"black, liquid stools\" since yesterday 10/24. RN during follow up call, advised patient to come to [facility] ED to have an evaluation.   "

## 2023-10-25 NOTE — ED PROVIDER NOTES
"Time: 7:17 PM EDT  Date of encounter:  10/25/2023  Independent Historian/Clinical History and Information was obtained by:   Patient    History is limited by: N/A    Chief Complaint: Dark stools      History of Present Illness:  Patient is a 43 y.o. year old male who presents to the emergency department for evaluation of dark stools.  Patient reports that he had an EGD and colonoscopy yesterday.  States that he woke up today and was continued to have some loose stools.  They were dark in nature.  He does report they were what looked black.  States he did have 1 more episode today and had black stools but it was not having bright red and did not have anything else in the toilet water.  He spoke with the nurse navigator on a follow-up and they recommended he come to the emergency room.  Denies any chest pain.  Patient not on blood thinners.  Did have several polyps removed as well as biopsies.  No other complaints time    HPI    Patient Care Team  Primary Care Provider: Helen Sims APRN    Past Medical History:     Allergies   Allergen Reactions    Cefdinir Unknown - High Severity    Contrast Dye (Echo Or Unknown Ct/Mr) Unknown - High Severity     Skin turns purple    Erythromycin Unknown - High Severity    Penicillins Unknown - High Severity     Past Medical History:   Diagnosis Date    Anesthesia complication     Patient states \"had trouble breathing during previous colonoscopy.\"    Asthma     Degenerative disc disease, cervical     Hyperlipidemia     Hypertension      Past Surgical History:   Procedure Laterality Date    COLONOSCOPY      COLONOSCOPY N/A 10/24/2023    Procedure: COLONOSCOPY with biopsy and cold snare polypecotmy;  Surgeon: Sarah Funez MD;  Location: [facility] ENDOSCOPY;  Service: Gastroenterology;  Laterality: N/A;  colon polyps, diverticulosis    ENDOSCOPY N/A 10/24/2023    Procedure: ESOPHAGOGASTRODUODENOSCOPY with biopsy;  Surgeon: Sarah Funez MD;  Location: [facility] " "ENDOSCOPY;  Service: Gastroenterology;  Laterality: N/A;  esophagitis, hiatal hernia    HEMORRHOIDECTOMY      TESTICLE UNDESCENDED REPAIR      UPPER GASTROINTESTINAL ENDOSCOPY       Family History   Problem Relation Age of Onset    Stomach cancer Father     Colon cancer Father        Home Medications:  Prior to Admission medications    Medication Sig Start Date End Date Taking? Authorizing Provider   albuterol sulfate  (90 Base) MCG/ACT inhaler Inhale 2 puffs Every 4 (Four) Hours As Needed.    Noris Delgado MD   atenolol (TENORMIN) 25 MG tablet Take 1 tablet by mouth Daily. 23   Noris Delgado MD   gemfibrozil (LOPID) 600 MG tablet TAKE 1 TABLET BY MOUTH TWICE DAILY, 30 MINUTES BEFORE MORNING AND EVENING MEALS 23   Noris Delgado MD   pantoprazole (PROTONIX) 20 MG EC tablet Take 1 tablet by mouth Daily. 10/24/23   Sarah Funez MD   Testosterone Cypionate (DEPOTESTOTERONE CYPIONATE) 200 MG/ML injection inject 0.5 ml INTRAMUSCULARLY once weekly (discard vial once dose is drawn- vial is only intended to be punctured once) 23   Noris Delgado MD   tiZANidine (ZANAFLEX) 4 MG tablet Take 1 tablet by mouth At Night As Needed for Muscle Spasms. 21   Hanny Gunter APRN        Social History:   Social History     Tobacco Use    Smoking status: Former     Types: Cigarettes     Quit date: 2009     Years since quittin.8    Smokeless tobacco: Never   Vaping Use    Vaping Use: Never used   Substance Use Topics    Alcohol use: Not Currently    Drug use: Defer         Review of Systems:  Review of Systems     Physical Exam:  /85   Pulse 89   Temp 98.5 °F (36.9 °C) (Oral)   Resp 18   Ht 195.6 cm (77\")   Wt 120 kg (264 lb 5.3 oz)   SpO2 96%   BMI 31.35 kg/m²     Physical Exam  Vitals and nursing note reviewed.   Constitutional:       Appearance: Normal appearance.   HENT:      Head: Normocephalic and atraumatic.   Eyes:      General: No " scleral icterus.  Cardiovascular:      Rate and Rhythm: Normal rate and regular rhythm.   Pulmonary:      Effort: Pulmonary effort is normal.      Breath sounds: Normal breath sounds.   Abdominal:      Palpations: Abdomen is soft.      Tenderness: There is no abdominal tenderness.   Musculoskeletal:         General: Normal range of motion.      Cervical back: Normal range of motion.   Skin:     Findings: No rash.   Neurological:      Mental Status: He is alert.                  Procedures:  Procedures      Medical Decision Making:      Comorbidities that affect care:    Asthma, Hypertension    External Notes reviewed:    Reviewed EGD and colonoscopy reports.      The following orders were placed and all results were independently analyzed by me:  Orders Placed This Encounter   Procedures    [place] Draw    Comprehensive Metabolic Panel    Occult Blood, Fecal By Immunoassay - Stool, Per Rectum    CBC Auto Differential    NPO Diet NPO Type: Strict NPO    Vital Signs    Inpatient Gastroenterology Consult    Pulse Oximetry    Oxygen Therapy- Nasal Cannula; Titrate 1-6 LPM Per SpO2; 90 - 95%    Insert Peripheral IV    CBC & Differential    Green Top (Gel)    Lavender Top    Gold Top - SST    Light Blue Top       Medications Given in the Emergency Department:  Medications   sodium chloride 0.9 % flush 10 mL (has no administration in time range)        ED Course:    ED Course as of 10/25/23 1926   Wed Oct 25, 2023   1922 Spoke with Dr. Lee his stated this is likely old blood.  If he is not on blood thinners and he has no pain.  He is okay for outpatient follow-up.  Should he have new or worsening symptoms or bright red blood he needs to return. [MA]      ED Course User Index  [MA] Dwaine Flores MD       Labs:    Lab Results (last 24 hours)       Procedure Component Value Units Date/Time    CBC & Differential [[id number]]  (Abnormal) Collected: 10/25/23 1716    Specimen: Blood Updated: 10/25/23 1726    Narrative:       The following orders were created for panel order CBC & Differential.  Procedure                               Abnormality         Status                     ---------                               -----------         ------                     CBC Auto Differential[[id number]]        Abnormal            Final result                 Please view results for these tests on the individual orders.    Comprehensive Metabolic Panel [[id number]]  (Abnormal) Collected: 10/25/23 1716    Specimen: Blood Updated: 10/25/23 1751     Glucose 103 mg/dL      BUN 27 mg/dL      Creatinine 0.83 mg/dL      Sodium 139 mmol/L      Potassium 3.2 mmol/L      Chloride 101 mmol/L      CO2 26.2 mmol/L      Calcium 9.7 mg/dL      Total Protein 7.6 g/dL      Albumin 4.4 g/dL      ALT (SGPT) 25 U/L      AST (SGOT) 16 U/L      Alkaline Phosphatase 72 U/L      Total Bilirubin 1.0 mg/dL      Globulin 3.2 gm/dL      A/G Ratio 1.4 g/dL      BUN/Creatinine Ratio 32.5     Anion Gap 11.8 mmol/L      eGFR 112.8 mL/min/1.73     Narrative:      GFR Normal >60  Chronic Kidney Disease <60  Kidney Failure <15      CBC Auto Differential [[id number]]  (Abnormal) Collected: 10/25/23 1716    Specimen: Blood Updated: 10/25/23 1726     WBC 9.86 10*3/mm3      RBC 5.48 10*6/mm3      Hemoglobin 15.8 g/dL      Hematocrit 48.0 %      MCV 87.6 fL      MCH 28.8 pg      MCHC 32.9 g/dL      RDW 13.7 %      RDW-SD 43.7 fl      MPV 10.2 fL      Platelets 273 10*3/mm3      Neutrophil % 67.5 %      Lymphocyte % 18.5 %      Monocyte % 10.6 %      Eosinophil % 2.5 %      Basophil % 0.5 %      Immature Grans % 0.4 %      Neutrophils, Absolute 6.65 10*3/mm3      Lymphocytes, Absolute 1.82 10*3/mm3      Monocytes, Absolute 1.05 10*3/mm3      Eosinophils, Absolute 0.25 10*3/mm3      Basophils, Absolute 0.05 10*3/mm3      Immature Grans, Absolute 0.04 10*3/mm3      nRBC 0.0 /100 WBC     Occult Blood, Fecal By Immunoassay - Stool, Per Rectum [[id number]]  (Abnormal) Collected: 10/25/23  1837    Specimen: Stool from Per Rectum Updated: 10/25/23 1855     Occult Blood, Fecal by Immunoassay Positive             Imaging:    No Radiology Exams Resulted Within Past 24 Hours      Differential Diagnosis and Discussion:    GI Bleeding: Differential diagnosis includes but is not limited to gastritis, gastric ulcer, stress ulcer, duodenitis, Renetta-Negrete tears, esophageal varices, angiodysplasia, aortic enteric fistula, hematologic issues including thrombocytopenia, GI neoplasm, ulcerative colitis, Crohn's disease, diverticulosis, diverticulitis, hemorrhoids, aortic aneurysm, and polyps    All labs were reviewed and interpreted by me.    [facility]           Patient Care Considerations:          Consultants/Shared Management Plan:    Consultant: I have discussed the case with Dr. Blanca who states okay for outpatient follow-up.  Should he have new or worsening symptoms needs return.    Social Determinants of Health:    Patient is independent, reliable, and has access to care.       Disposition and Care Coordination:    Discharged: The patient is suitable and stable for discharge with no need for consideration of observation or admission.    I have explained the patient´s condition, diagnoses and treatment plan based on the information available to me at this time. I have answered questions and addressed any concerns. The patient has a good  understanding of the patient´s diagnosis, condition, and treatment plan as can be expected at this point. The vital signs have been stable. The patient´s condition is stable and appropriate for discharge from the emergency department.      The patient will pursue further outpatient evaluation with the primary care physician or other designated or consulting physician as outlined in the discharge instructions. They are agreeable to this plan of care and follow-up instructions have been explained in detail. The patient has received these instructions in written format and have  expressed an understanding of the discharge instructions. The patient is aware that any significant change in condition or worsening of symptoms should prompt an immediate return to this or the closest emergency department or call to 911.      Final diagnoses:   Dark stools        ED Disposition       ED Disposition   Discharge    Condition   Stable    Comment   --               This medical record created using voice recognition software.             Dwaine Flores MD  10/25/23 1924

## 2023-10-26 ENCOUNTER — TELEPHONE (OUTPATIENT)
Dept: GASTROENTEROLOGY | Facility: CLINIC | Age: 43
End: 2023-10-26
Payer: COMMERCIAL

## 2023-10-26 NOTE — TELEPHONE ENCOUNTER
----- Message from FERNANDA Luu sent at 10/25/2023  2:49 PM EDT -----  Biopsies are consistent with reflux esophagitis.  Continue current PPI therapy and schedule for follow-up.  Colon biopsies benign.  Recall colonoscopy in 5 years.  Please send letter to patient and PCP.

## 2023-10-26 NOTE — TELEPHONE ENCOUNTER
Patient notified of results and recommendations.   Follow up appt scheduled 1/3/24.  5y recall placed. Care gap updated. Letter mailed to pt and pcp.

## 2024-01-03 ENCOUNTER — OFFICE VISIT (OUTPATIENT)
Dept: GASTROENTEROLOGY | Facility: CLINIC | Age: 44
End: 2024-01-03
Payer: COMMERCIAL

## 2024-01-03 VITALS
DIASTOLIC BLOOD PRESSURE: 91 MMHG | HEIGHT: 77 IN | HEART RATE: 67 BPM | BODY MASS INDEX: 34.81 KG/M2 | SYSTOLIC BLOOD PRESSURE: 131 MMHG | WEIGHT: 294.8 LBS

## 2024-01-03 DIAGNOSIS — K21.00 GASTROESOPHAGEAL REFLUX DISEASE WITH ESOPHAGITIS WITHOUT HEMORRHAGE: ICD-10-CM

## 2024-01-03 DIAGNOSIS — R10.12 LEFT UPPER QUADRANT ABDOMINAL PAIN: Primary | ICD-10-CM

## 2024-01-03 DIAGNOSIS — K44.9 HIATAL HERNIA: ICD-10-CM

## 2024-01-03 DIAGNOSIS — K59.00 CONSTIPATION, UNSPECIFIED CONSTIPATION TYPE: ICD-10-CM

## 2024-01-03 PROCEDURE — 1160F RVW MEDS BY RX/DR IN RCRD: CPT | Performed by: NURSE PRACTITIONER

## 2024-01-03 PROCEDURE — 1159F MED LIST DOCD IN RCRD: CPT | Performed by: NURSE PRACTITIONER

## 2024-01-03 PROCEDURE — 99214 OFFICE O/P EST MOD 30 MIN: CPT | Performed by: NURSE PRACTITIONER

## 2024-01-03 RX ORDER — SILDENAFIL 25 MG/1
TABLET, FILM COATED ORAL
COMMUNITY
Start: 2023-11-06

## 2024-01-03 RX ORDER — LANSOPRAZOLE 30 MG/1
30 CAPSULE, DELAYED RELEASE ORAL 2 TIMES DAILY
Qty: 60 CAPSULE | Refills: 0 | Status: SHIPPED | OUTPATIENT
Start: 2024-01-03

## 2024-01-03 RX ORDER — ATENOLOL AND CHLORTHALIDONE TABLET 100; 25 MG/1; MG/1
1 TABLET ORAL DAILY
COMMUNITY
Start: 2023-12-05

## 2024-01-03 RX ORDER — NEEDLES, FILTER 19GX1 1/2"
NEEDLE, DISPOSABLE MISCELLANEOUS
COMMUNITY
Start: 2023-12-04

## 2024-01-03 NOTE — PATIENT INSTRUCTIONS
Continue miralax daily or every other day for now  Start prevacid (lansoprazole) 30 mg twice a day before food or drink    Give me update in 2-4 days

## 2024-01-03 NOTE — PROGRESS NOTES
Chief Complaint   Heartburn and Abdominal Pain (Epigastric )    History of Present Illness       Nabeel Fraser is a 43 y.o. male who presents to [facility] GASTROENTEROLOGY for follow-up for GERD. He was last seen in the office by me 8/15/23.     EGD/colonoscopy---In [place] several years ago. Both were normal per patient report. Hx hemorrhoidectomy. Has been treated for IBS in the past.      Reports intermittent issues with LUQ abd pain. He wakes up a lot with acid in his throat. He admits sometimes anything he drinks will bother him. He does have a BM every morning. He will have fecal urgency with loose stools on occasion. He doesn't take anything regularly for the loose stools because then he will get backed up. He denies any rectal bleeding or melena.  He admits he has tried several over-the-counter meds for reflux in the past.  Nothing is ever really seem to work.  Currently he will just drink milk until it helps.     GI family history----Father with colon cancer with METS to the stomach.      He does admit he was a heavy drinker when he was younger. He does not drink now. He does not smoke. Denies any recreational drug use.       He underwent EGD and colonoscopy with Dr. Funez on 10/20/2023.  EGD showed LA grade a reflux esophagitis with a small hiatal hernia and gastritis.  Colonoscopy showed 3 mm descending colon polyp which was removed, 4 mm sigmoid colon polyp which was removed, 5 mm rectal polyp which was removed.  Mild diverticulosis.  Otherwise normal exam.  Path positive for reflux esophagitis.  Tubular adenoma.  Recall colonoscopy in 5 years.    He admits the protonix really helped his reflux but made the constipation worse. He ended up in the ER at [facility] on 12/29/23 after having worsening LUQ abd pain and hiccups for 6 days. CT scan was negative for any acute process. Now having more Bms but reports they are black. He is taking NSAIDs PRN. Quit taking the  "protonix due to constipation.     Results       Result Review :       CMP          10/25/2023    17:16   CMP   Glucose 103    BUN 27    Creatinine 0.83    EGFR 112.8    Sodium 139    Potassium 3.2    Chloride 101    Calcium 9.7    Total Protein 7.6    Albumin 4.4    Globulin 3.2    Total Bilirubin 1.0    Alkaline Phosphatase 72    AST (SGOT) 16    ALT (SGPT) 25    Albumin/Globulin Ratio 1.4    BUN/Creatinine Ratio 32.5    Anion Gap 11.8      CBC          10/25/2023    17:16   CBC   WBC 9.86    RBC 5.48    Hemoglobin 15.8    Hematocrit 48.0    MCV 87.6    MCH 28.8    MCHC 32.9    RDW 13.7    Platelets 273            Lipase No results found for: \"LIPASE\"  Amylase No results found for: \"AMYLASE\"            Past Medical History       Past Medical History:   Diagnosis Date    Anesthesia complication     Patient states \"had trouble breathing during previous colonoscopy.\"    Asthma     Degenerative disc disease, cervical     Hyperlipidemia     Hypertension        Past Surgical History:   Procedure Laterality Date    COLONOSCOPY      COLONOSCOPY N/A 10/24/2023    Procedure: COLONOSCOPY with biopsy and cold snare polypecotmy;  Surgeon: Sarah Funez MD;  Location: [facility] ENDOSCOPY;  Service: Gastroenterology;  Laterality: N/A;  colon polyps, diverticulosis    ENDOSCOPY N/A 10/24/2023    Procedure: ESOPHAGOGASTRODUODENOSCOPY with biopsy;  Surgeon: Sarah Funez MD;  Location: [facility] ENDOSCOPY;  Service: Gastroenterology;  Laterality: N/A;  esophagitis, hiatal hernia    HEMORRHOIDECTOMY      TESTICLE UNDESCENDED REPAIR      UPPER GASTROINTESTINAL ENDOSCOPY           Current Outpatient Medications:     albuterol sulfate  (90 Base) MCG/ACT inhaler, Inhale 2 puffs Every 4 (Four) Hours As Needed., Disp: , Rfl:     atenolol-chlorthalidone (TENORETIC) 100-25 MG per tablet, Take 1 tablet by mouth Daily., Disp: , Rfl:     BD Integra Syringe 25G X 1\" 3 ML misc, USE AS DIRECTED INTRAMUSCULARLY with " "testosterone once weekly, Disp: , Rfl:     gemfibrozil (LOPID) 600 MG tablet, TAKE 1 TABLET BY MOUTH TWICE DAILY, 30 MINUTES BEFORE MORNING AND EVENING MEALS, Disp: , Rfl:     sildenafil (VIAGRA) 25 MG tablet, TAKE 1 TO 4 TABLETS BY MOUTH 1/2 HOUR TO 4 HOURS BEFORE SEXUAL ACTIVITY AS NEEDED, Disp: , Rfl:     Testosterone Cypionate (DEPOTESTOTERONE CYPIONATE) 200 MG/ML injection, inject 0.5 ml INTRAMUSCULARLY once weekly (discard vial once dose is drawn- vial is only intended to be punctured once), Disp: , Rfl:     tiZANidine (ZANAFLEX) 4 MG tablet, Take 1 tablet by mouth At Night As Needed for Muscle Spasms., Disp: 30 tablet, Rfl: 5    lansoprazole (PREVACID) 30 MG capsule, Take 1 capsule by mouth 2 (Two) Times a Day., Disp: 60 capsule, Rfl: 0     Allergies   Allergen Reactions    Cefdinir Unknown - High Severity    Contrast Dye (Echo Or Unknown Ct/Mr) Unknown - High Severity     Skin turns purple    Erythromycin Unknown - High Severity    Penicillins Unknown - High Severity       Family History   Problem Relation Age of Onset    Stomach cancer Father     Colon cancer Father         Social History     Social History Narrative    Not on file       Objective       Review of Systems   Constitutional:  Negative for appetite change, fatigue, fever, unexpected weight gain and unexpected weight loss.   HENT:  Negative for trouble swallowing.    Respiratory:  Negative for cough, choking, chest tightness, shortness of breath, wheezing and stridor.    Cardiovascular:  Negative for chest pain, palpitations and leg swelling.   Gastrointestinal:  Positive for abdominal distention, abdominal pain, constipation, nausea, GERD and indigestion. Negative for anal bleeding, blood in stool, diarrhea, rectal pain and vomiting.        Vital Signs:   /91 (BP Location: Left arm, Patient Position: Sitting, Cuff Size: Adult)   Pulse 67   Ht 195.6 cm (77\")   Wt 134 kg (294 lb 12.8 oz)   BMI 34.96 kg/m²       Physical " Exam  Constitutional:       General: He is not in acute distress.     Appearance: He is well-developed. He is not ill-appearing.   HENT:      Head: Normocephalic.   Eyes:      Pupils: Pupils are equal, round, and reactive to light.   Cardiovascular:      Rate and Rhythm: Normal rate and regular rhythm.      Heart sounds: Normal heart sounds.   Pulmonary:      Effort: Pulmonary effort is normal.      Breath sounds: Normal breath sounds.   Abdominal:      General: Bowel sounds are normal. There is distension.      Palpations: Abdomen is soft. There is no mass.      Tenderness: There is abdominal tenderness. There is no guarding or rebound.      Hernia: No hernia is present.       Musculoskeletal:         General: Normal range of motion.   Skin:     General: Skin is warm and dry.   Neurological:      Mental Status: He is alert and oriented to person, place, and time.   Psychiatric:         Speech: Speech normal.         Behavior: Behavior normal.         Judgment: Judgment normal.           Assessment & Plan          Assessment and Plan    Diagnoses and all orders for this visit:    1. Left upper quadrant abdominal pain (Primary)    2. Gastroesophageal reflux disease with esophagitis without hemorrhage  -     lansoprazole (PREVACID) 30 MG capsule; Take 1 capsule by mouth 2 (Two) Times a Day.  Dispense: 60 capsule; Refill: 0    3. Hiatal hernia    4. Constipation, unspecified constipation type      Reviewed EGD and colonoscopy results with him today.  Reviewed [facility] ER notes.  CT scan was negative.  Sounds like his gastritis is really flaring up badly and his constipation is worse.  He needs to get back on PPI.  Will trial Prevacid 30 mg twice daily and see how he does.  Would like him to take MiraLAX every day for now to keep his bowels moving better.  Labs were stable at the ER.  Patient to give me an update in 1 week.  Patient to follow-up with me in 1 month.  Patient is agreeable to the  plan.          Follow Up       Follow Up   Return in about 4 weeks (around 1/31/2024) for ABDOMINAL PAIN.  Patient was given instructions and counseling regarding his condition or for health maintenance advice. Please see specific information pulled into the AVS if appropriate.

## 2024-01-08 ENCOUNTER — TELEPHONE (OUTPATIENT)
Dept: GASTROENTEROLOGY | Facility: CLINIC | Age: 44
End: 2024-01-08
Payer: COMMERCIAL

## 2024-01-08 NOTE — TELEPHONE ENCOUNTER
"Pt left voicemail stating he wanted you to be aware he feels \"good\" with rx change, no other info left   "

## 2024-02-15 ENCOUNTER — OFFICE VISIT (OUTPATIENT)
Dept: GASTROENTEROLOGY | Facility: CLINIC | Age: 44
End: 2024-02-15
Payer: COMMERCIAL

## 2024-02-15 VITALS
DIASTOLIC BLOOD PRESSURE: 87 MMHG | HEART RATE: 58 BPM | SYSTOLIC BLOOD PRESSURE: 134 MMHG | WEIGHT: 293.4 LBS | HEIGHT: 77 IN | BODY MASS INDEX: 34.64 KG/M2

## 2024-02-15 DIAGNOSIS — K44.9 HIATAL HERNIA: ICD-10-CM

## 2024-02-15 DIAGNOSIS — R10.12 LEFT UPPER QUADRANT ABDOMINAL PAIN: Primary | ICD-10-CM

## 2024-02-15 DIAGNOSIS — K21.00 GASTROESOPHAGEAL REFLUX DISEASE WITH ESOPHAGITIS WITHOUT HEMORRHAGE: ICD-10-CM

## 2024-02-15 DIAGNOSIS — K59.00 CONSTIPATION, UNSPECIFIED CONSTIPATION TYPE: ICD-10-CM

## 2024-02-15 PROCEDURE — 99214 OFFICE O/P EST MOD 30 MIN: CPT | Performed by: NURSE PRACTITIONER

## 2024-02-15 PROCEDURE — 1160F RVW MEDS BY RX/DR IN RCRD: CPT | Performed by: NURSE PRACTITIONER

## 2024-02-15 PROCEDURE — 1159F MED LIST DOCD IN RCRD: CPT | Performed by: NURSE PRACTITIONER

## 2024-02-15 RX ORDER — LANSOPRAZOLE 30 MG/1
30 CAPSULE, DELAYED RELEASE ORAL 2 TIMES DAILY
Qty: 180 CAPSULE | Refills: 3 | Status: SHIPPED | OUTPATIENT
Start: 2024-02-15

## 2024-06-26 ENCOUNTER — TRANSCRIBE ORDERS (OUTPATIENT)
Dept: ADMINISTRATIVE | Facility: HOSPITAL | Age: 44
End: 2024-06-26
Payer: COMMERCIAL

## 2024-06-26 DIAGNOSIS — E04.1 NONTOXIC UNINODULAR GOITER: Primary | ICD-10-CM

## 2024-07-03 ENCOUNTER — HOSPITAL ENCOUNTER (OUTPATIENT)
Dept: ULTRASOUND IMAGING | Facility: HOSPITAL | Age: 44
Discharge: HOME | End: 2024-07-03
Admitting: NURSE PRACTITIONER
Payer: COMMERCIAL

## 2024-07-03 DIAGNOSIS — E04.1 NONTOXIC UNINODULAR GOITER: ICD-10-CM

## 2024-07-03 PROCEDURE — 76536 US EXAM OF HEAD AND NECK: CPT

## 2024-10-14 ENCOUNTER — OFFICE VISIT (OUTPATIENT)
Dept: GASTROENTEROLOGY | Facility: CLINIC | Age: 44
End: 2024-10-14
Payer: COMMERCIAL

## 2024-10-14 VITALS
HEART RATE: 53 BPM | WEIGHT: 294.8 LBS | BODY MASS INDEX: 34.81 KG/M2 | DIASTOLIC BLOOD PRESSURE: 75 MMHG | HEIGHT: 77 IN | SYSTOLIC BLOOD PRESSURE: 120 MMHG

## 2024-10-14 DIAGNOSIS — R10.12 LEFT UPPER QUADRANT ABDOMINAL PAIN: ICD-10-CM

## 2024-10-14 DIAGNOSIS — K21.00 GASTROESOPHAGEAL REFLUX DISEASE WITH ESOPHAGITIS WITHOUT HEMORRHAGE: Primary | ICD-10-CM

## 2024-10-14 DIAGNOSIS — K59.04 CHRONIC IDIOPATHIC CONSTIPATION: ICD-10-CM

## 2024-10-14 DIAGNOSIS — K44.9 HIATAL HERNIA: ICD-10-CM

## 2024-10-14 PROCEDURE — 99213 OFFICE O/P EST LOW 20 MIN: CPT | Performed by: NURSE PRACTITIONER

## 2024-10-14 PROCEDURE — 1160F RVW MEDS BY RX/DR IN RCRD: CPT | Performed by: NURSE PRACTITIONER

## 2024-10-14 PROCEDURE — 1159F MED LIST DOCD IN RCRD: CPT | Performed by: NURSE PRACTITIONER

## 2024-10-14 RX ORDER — LOSARTAN POTASSIUM 50 MG/1
1 TABLET ORAL DAILY
COMMUNITY
Start: 2024-08-26

## 2024-10-14 NOTE — PROGRESS NOTES
Chief Complaint   Abdominal Pain    History of Present Illness       Nabeel Fraser is a 44 y.o. male who presents to [facility] for follow-up for GERD.  He was last seen in the office by me on 2/15/2024.    EGD/colonoscopy---In [place] several years ago. Both were normal per patient report. Hx hemorrhoidectomy. Has been treated for IBS in the past.      Reports intermittent issues with LUQ abd pain. He wakes up a lot with acid in his throat. He admits sometimes anything he drinks will bother him. He does have a BM every morning. He will have fecal urgency with loose stools on occasion. He doesn't take anything regularly for the loose stools because then he will get backed up. He denies any rectal bleeding or melena.  He admits he has tried several over-the-counter meds for reflux in the past.  Nothing is ever really seem to work.  Currently he will just drink milk until it helps.     GI family history----Father with colon cancer with METS to the stomach.      He does admit he was a heavy drinker when he was younger. He does not drink now. He does not smoke. Denies any recreational drug use.         He underwent EGD and colonoscopy with Dr. Funez on 10/20/2023.  EGD showed LA grade a reflux esophagitis with a small hiatal hernia and gastritis.  Colonoscopy showed 3 mm descending colon polyp which was removed, 4 mm sigmoid colon polyp which was removed, 5 mm rectal polyp which was removed.  Mild diverticulosis.  Otherwise normal exam.  Path positive for reflux esophagitis.  Tubular adenoma.  Recall colonoscopy in 5 years.     Doing well with prevacid 30 mg daily. Denies any breakthrough reflux. Bowels doing well as long as he avoids cheese. No longer needing miralax.   Results       Result Review :       CMP          10/25/2023    17:16   CMP   Glucose 103    BUN 27    Creatinine 0.83    EGFR 112.8    Sodium 139    Potassium 3.2    Chloride 101    Calcium 9.7    Total  "Protein 7.6    Albumin 4.4    Globulin 3.2    Total Bilirubin 1.0    Alkaline Phosphatase 72    AST (SGOT) 16    ALT (SGPT) 25    Albumin/Globulin Ratio 1.4    BUN/Creatinine Ratio 32.5    Anion Gap 11.8      CBC          10/25/2023    17:16   CBC   WBC 9.86    RBC 5.48    Hemoglobin 15.8    Hematocrit 48.0    MCV 87.6    MCH 28.8    MCHC 32.9    RDW 13.7    Platelets 273      CBC w/diff          10/25/2023    17:16   CBC w/Diff   WBC 9.86    RBC 5.48    Hemoglobin 15.8    Hematocrit 48.0    MCV 87.6    MCH 28.8    MCHC 32.9    RDW 13.7    Platelets 273    Neutrophil Rel % 67.5    Immature Granulocyte Rel % 0.4    Lymphocyte Rel % 18.5    Monocyte Rel % 10.6    Eosinophil Rel % 2.5    Basophil Rel % 0.5                        Past Medical History       Past Medical History:   Diagnosis Date    Anesthesia complication     Patient states \"had trouble breathing during previous colonoscopy.\"    Asthma     Degenerative disc disease, cervical     Hyperlipidemia     Hypertension        Past Surgical History:   Procedure Laterality Date    COLONOSCOPY      COLONOSCOPY N/A 10/24/2023    Procedure: COLONOSCOPY with biopsy and cold snare polypecotmy;  Surgeon: Sarah Funez MD;  Location: [facility];  Service: Gastroenterology;  Laterality: N/A;  colon polyps, diverticulosis    ENDOSCOPY N/A 10/24/2023    Procedure: ESOPHAGOGASTRODUODENOSCOPY with biopsy;  Surgeon: Sarah Funez MD;  Location: [facility];  Service: Gastroenterology;  Laterality: N/A;  esophagitis, hiatal hernia    HEMORRHOIDECTOMY      TESTICLE UNDESCENDED REPAIR      UPPER GASTROINTESTINAL ENDOSCOPY           Current Outpatient Medications:     albuterol sulfate  (90 Base) MCG/ACT inhaler, Inhale 2 puffs Every 4 (Four) Hours As Needed., Disp: , Rfl:     atenolol-chlorthalidone (TENORETIC) 100-25 MG per tablet, Take 1 tablet by mouth Daily., Disp: , Rfl:     BD Integra Syringe 25G X 1\" 3 ML misc, USE AS DIRECTED " "INTRAMUSCULARLY with testosterone once weekly, Disp: , Rfl:     gemfibrozil (LOPID) 600 MG tablet, TAKE 1 TABLET BY MOUTH TWICE DAILY, 30 MINUTES BEFORE MORNING AND EVENING MEALS, Disp: , Rfl:     lansoprazole (PREVACID) 30 MG capsule, Take 1 capsule by mouth 2 (Two) Times a Day., Disp: 180 capsule, Rfl: 3    losartan (COZAAR) 50 MG tablet, Take 1 tablet by mouth Daily., Disp: , Rfl:     sildenafil (VIAGRA) 25 MG tablet, TAKE 1 TO 4 TABLETS BY MOUTH 1/2 HOUR TO 4 HOURS BEFORE SEXUAL ACTIVITY AS NEEDED, Disp: , Rfl:     Testosterone Cypionate (DEPOTESTOTERONE CYPIONATE) 200 MG/ML injection, inject 0.5 ml INTRAMUSCULARLY once weekly (discard vial once dose is drawn- vial is only intended to be punctured once), Disp: , Rfl:     tiZANidine (ZANAFLEX) 4 MG tablet, Take 1 tablet by mouth At Night As Needed for Muscle Spasms., Disp: 30 tablet, Rfl: 5     Allergies   Allergen Reactions    Cefdinir Unknown - High Severity    Contrast Dye (Echo Or Unknown Ct/Mr) Unknown - High Severity     Skin turns purple    Erythromycin Unknown - High Severity    Penicillins Unknown - High Severity       Family History   Problem Relation Age of Onset    Stomach cancer Father     Colon cancer Father         Social History     Social History Narrative    Not on file       Objective       Review of Systems   Constitutional:  Negative for fatigue, fever, unexpected weight gain and unexpected weight loss.   HENT:  Negative for trouble swallowing.    Respiratory:  Negative for cough, choking, chest tightness, shortness of breath, wheezing and stridor.    Cardiovascular:  Negative for chest pain, palpitations and leg swelling.   Gastrointestinal:  Negative for abdominal distention, abdominal pain, anal bleeding, blood in stool, constipation, diarrhea, nausea, rectal pain, vomiting, GERD and indigestion.        Vital Signs:   /75 (BP Location: Right arm, Patient Position: Sitting, Cuff Size: Adult)   Pulse 53   Ht 195.6 cm (77\")   Wt 134 " kg (294 lb 12.8 oz)   BMI 34.96 kg/m²       Physical Exam  Constitutional:       General: He is not in acute distress.     Appearance: He is well-developed. He is not ill-appearing.   HENT:      Head: Normocephalic.   Eyes:      Pupils: Pupils are equal, round, and reactive to light.   Cardiovascular:      Rate and Rhythm: Normal rate and regular rhythm.      Heart sounds: Normal heart sounds.   Pulmonary:      Effort: Pulmonary effort is normal.      Breath sounds: Normal breath sounds.   Abdominal:      General: Bowel sounds are normal. There is no distension.      Palpations: Abdomen is soft. There is no mass.      Tenderness: There is no abdominal tenderness. There is no guarding or rebound.      Hernia: No hernia is present.   Musculoskeletal:         General: Normal range of motion.   Skin:     General: Skin is warm and dry.   Neurological:      Mental Status: He is alert and oriented to person, place, and time.   Psychiatric:         Speech: Speech normal.         Behavior: Behavior normal.         Judgment: Judgment normal.           Assessment & Plan          Assessment and Plan    Diagnoses and all orders for this visit:    1. Gastroesophageal reflux disease with esophagitis without hemorrhage (Primary)    2. Chronic idiopathic constipation    3. Left upper quadrant abdominal pain    4. Hiatal hernia    GERD seems well-controlled on Prevacid 30 mg daily.  Continue GERD precautions.  Bowels moving well as long as he avoids dairy.  Overall he seems to be doing really well.  Patient to call the office with any issues.  Patient to follow-up with me in 1 year.  Patient is agreeable to the plan.            Follow Up       Follow Up   Return in about 1 year (around 10/14/2025) for GERD, CONSTIPATION.  Patient was given instructions and counseling regarding his condition or for health maintenance advice. Please see specific information pulled into the AVS if appropriate.

## 2025-01-24 NOTE — PROGRESS NOTES
Chief Complaint: chronic prostatitis and new patient    Subjective         History of Present Illness  Nabeel Fraser is a 44 y.o. male presents to [facility] to be seen for prostatitis.    He reports to the office today with complaints of left sided abdominal and left flank pain.  He first noticed the pain about 4-5 years ago.  He reports that at first he only had the pain if he drank a lot of dark colored sodas.  Over the last two years he started having more pain.  He decided to cut back on sodas and this has helped with the pain.  He reports that he has seen GI and had multiple different tests and there was not identified cause for his pain.      He reports that he does have some occasional burning with urination.  He recently switched from drinking mainly soda to mostly water and this has helped this.  He does have some mild burning with urination today, but did drink two sodas yesterday.  He does also report perineal pain and feels like he is sitting on a ball at times.  His PCP started him on tamsulosin 3 months ago and this has helped some with his urinary symptoms.  He reports that when he stops taking the medication that pain he has in his abdomen and flank seems to worsen.  He feels like he is emptying his bladder completely.       He has not been treated for a prostatitis.  He reports that he had a CT scan of his abdomen done about a year ago.  His PCP is monitoring his PSA.    He has noticed that he has had issues with ED over the last two years.  He has issues obtaining and maintaining an erection.  He tried sildenafil, but that caused itching of the throat and chest area so he stopped that medication.  He is not interested in any other treatments for ED at this time as he would like to sort through his other issues before he even thinks about treating his ED.    Frequency-denies     Urgency-admits     Incontinence-admits     Nocturia-admits, 1-4 X per night    Dysuria-admits  "    Perineal pain-admits, sometimes has pain, sometimes feels like he is sitting on a ball    Stream-normal, was weak before starting tamsulosin     GH-denies     History of stones-denies      surgeries-denies     Family history of  malignancy-prostate cancer-father-diagnosed around age 45, bladder cancer-father-diagnosed early 30's     Cardiopulmonary-HTN, asthma    Anticoagulants-denies     Smoker-denies     PSA  11/21/2024 0.6    Objective     Past Medical History:   Diagnosis Date    Anesthesia complication     Patient states \"had trouble breathing during previous colonoscopy.\"    Asthma     Degenerative disc disease, cervical     Hyperlipidemia     Hypertension        Past Surgical History:   Procedure Laterality Date    COLONOSCOPY      COLONOSCOPY N/A 10/24/2023    Procedure: COLONOSCOPY with biopsy and cold snare polypecotmy;  Surgeon: Sarah Funez MD;  Location: [facility];  Service: Gastroenterology;  Laterality: N/A;  colon polyps, diverticulosis    ENDOSCOPY N/A 10/24/2023    Procedure: ESOPHAGOGASTRODUODENOSCOPY with biopsy;  Surgeon: Sarah Funez MD;  Location: [facility] END[facility]COPY;  Service: Gastroenterology;  Laterality: N/A;  esophagitis, hiatal hernia    HEMORRHOIDECTOMY      TESTICLE UNDESCENDED REPAIR      UPPER GASTROINTESTINAL ENDOSCOPY           Current Outpatient Medications:     albuterol sulfate  (90 Base) MCG/ACT inhaler, Inhale 2 puffs Every 4 (Four) Hours As Needed., Disp: , Rfl:     atenolol-chlorthalidone (TENORETIC) 100-25 MG per tablet, Take 1 tablet by mouth Daily., Disp: , Rfl:     atorvastatin (LIPITOR) 20 MG tablet, Take 1 tablet by mouth every night at bedtime., Disp: , Rfl:     BD Integra Syringe 25G X 1\" 3 ML misc, USE AS DIRECTED INTRAMUSCULARLY with testosterone once weekly, Disp: , Rfl:     lansoprazole (PREVACID) 30 MG capsule, Take 1 capsule by mouth 2 (Two) Times a Day., Disp: 180 capsule, Rfl: 3    losartan (COZAAR) 50 MG tablet, Take " "1 tablet by mouth Daily., Disp: , Rfl:     tamsulosin (FLOMAX) 0.4 MG capsule 24 hr capsule, TAKE 1 CAPSULE BY MOUTH EVERY DAY 30 MINS AFTER THE SAME MEAL EACH DAY, Disp: , Rfl:     Testosterone Cypionate (DEPOTESTOTERONE CYPIONATE) 200 MG/ML injection, inject 0.5 ml INTRAMUSCULARLY once weekly (discard vial once dose is drawn- vial is only intended to be punctured once), Disp: , Rfl:     tiZANidine (ZANAFLEX) 4 MG tablet, Take 1 tablet by mouth At Night As Needed for Muscle Spasms., Disp: 30 tablet, Rfl: 5    doxycycline (VIBRAMYCIN) 100 MG capsule, Take 1 capsule by mouth 2 (Two) Times a Day for 28 days., Disp: 56 capsule, Rfl: 0    Lactobacillus (Florajen Acidophilus) capsule, Take 1 capsule by mouth Daily., Disp: 28 capsule, Rfl: 0    Allergies   Allergen Reactions    Cefdinir Unknown - High Severity    Contrast Dye (Echo Or Unknown Ct/Mr) Unknown - High Severity     Skin turns purple    Erythromycin Unknown - High Severity    Penicillins Unknown - High Severity        Family History   Problem Relation Age of Onset    Stomach cancer Father     Colon cancer Father        Social History     Socioeconomic History    Marital status:    Tobacco Use    Smoking status: Former     Current packs/day: 0.00     Types: Cigarettes     Quit date: 2009     Years since quittin.0    Smokeless tobacco: Never   Vaping Use    Vaping status: Never Used   Substance and Sexual Activity    Alcohol use: Not Currently    Drug use: Defer    Sexual activity: Not Currently       Vital Signs:   Resp 12   Ht 195.6 cm (77\")   Wt 133 kg (294 lb)   BMI 34.86 kg/m²      Physical Exam  Vitals and nursing note reviewed.   Constitutional:       General: He is not in acute distress.     Appearance: Normal appearance. He is not toxic-appearing.   Pulmonary:      Effort: Pulmonary effort is normal. No respiratory distress.   Neurological:      General: No focal deficit present.      Mental Status: He is alert and oriented to person, " place, and time.          Result Review :   The following data was reviewed by: FERNANDA Ivory on 01/29/2025:       Results for orders placed or performed in visit on 01/29/25   Bladder Scan    Collection Time: 01/29/25  8:52 AM   Result Value Ref Range    Urine Volume 0      Bladder Scan interpretation 01/29/2025    Estimation of residual urine via BVI 3000 [address] Bladder Scan  MA/nurse performing: Robina CADET MA  Residual Urine: 0 ml  Indication: Prostatitis, unspecified prostatitis type    Dysuria    Erectile dysfunction, unspecified erectile dysfunction type   Position: Supine  Examination: Incremental scanning of the suprapubic area using 2.0 MHz transducer using copious amounts of acoustic gel.   Findings: An anechoic area was demonstrated which represented the bladder, with measurement of residual urine as noted. I inspected this myself. In that the residual urine was stable or insignificant, refer to plan for treatment and plan necessary at this time.           Procedures        Assessment and Plan    Diagnoses and all orders for this visit:    1. Prostatitis, unspecified prostatitis type (Primary)  -     Bladder Scan  -     doxycycline (VIBRAMYCIN) 100 MG capsule; Take 1 capsule by mouth 2 (Two) Times a Day for 28 days.  Dispense: 56 capsule; Refill: 0  -     Lactobacillus (Florajen Acidophilus) capsule; Take 1 capsule by mouth Daily.  Dispense: 28 capsule; Refill: 0    2. Dysuria    3. Erectile dysfunction, unspecified erectile dysfunction type    Given the patient's symptoms, I think it makes most sense to go ahead and treat him for a prostatitis with a month-long course of antibiotics.  The patient should continue his tamsulosin.  He should continue to avoid bladder irritants, including sodas.  Will plan to see the patient back in the office in about 2 months to see if his symptoms improve after treatment with antibiotics.  If his symptoms worsen, we can always try to see him sooner.  We can discuss  treatment for ED at his follow-up appointment if he desires.  The patient verbalized understanding and agrees with plan of care.    Follow-up in 2 months.      Follow Up   Return in about 2 months (around 3/29/2025).  Patient was given instructions and counseling regarding his condition or for health maintenance advice. Please see specific information pulled into the AVS if appropriate.         This document has been electronically signed by FERNANDA Ivory  January 29, 2025 12:50 EST

## 2025-01-29 ENCOUNTER — OFFICE VISIT (OUTPATIENT)
Dept: UROLOGY | Age: 45
End: 2025-01-29
Payer: COMMERCIAL

## 2025-01-29 VITALS — HEIGHT: 77 IN | WEIGHT: 294 LBS | RESPIRATION RATE: 12 BRPM | BODY MASS INDEX: 34.71 KG/M2

## 2025-01-29 DIAGNOSIS — R30.0 DYSURIA: ICD-10-CM

## 2025-01-29 DIAGNOSIS — N52.9 ERECTILE DYSFUNCTION, UNSPECIFIED ERECTILE DYSFUNCTION TYPE: ICD-10-CM

## 2025-01-29 DIAGNOSIS — N41.9 PROSTATITIS, UNSPECIFIED PROSTATITIS TYPE: Primary | ICD-10-CM

## 2025-01-29 LAB — URINE VOLUME: 0

## 2025-01-29 RX ORDER — ATORVASTATIN CALCIUM 20 MG/1
20 TABLET, FILM COATED ORAL
COMMUNITY
Start: 2024-11-22

## 2025-01-29 RX ORDER — LACTOBACILLUS ACIDOPHILUS 20B CELL
1 CAPSULE ORAL DAILY
Qty: 28 CAPSULE | Refills: 0 | Status: SHIPPED | OUTPATIENT
Start: 2025-01-29

## 2025-01-29 RX ORDER — DOXYCYCLINE 100 MG/1
100 CAPSULE ORAL 2 TIMES DAILY
Qty: 56 CAPSULE | Refills: 0 | Status: SHIPPED | OUTPATIENT
Start: 2025-01-29 | End: 2025-02-26

## 2025-01-29 RX ORDER — TAMSULOSIN HYDROCHLORIDE 0.4 MG/1
CAPSULE ORAL
COMMUNITY
Start: 2025-01-27

## 2025-01-30 ENCOUNTER — TELEPHONE (OUTPATIENT)
Dept: GASTROENTEROLOGY | Facility: CLINIC | Age: 45
End: 2025-01-30
Payer: COMMERCIAL

## 2025-02-11 ENCOUNTER — TRANSCRIBE ORDERS (OUTPATIENT)
Dept: ADMINISTRATIVE | Facility: HOSPITAL | Age: 45
End: 2025-02-11
Payer: COMMERCIAL

## 2025-02-11 DIAGNOSIS — E04.1 NONTOXIC UNINODULAR GOITER: Primary | ICD-10-CM

## 2025-02-25 DIAGNOSIS — K21.00 GASTROESOPHAGEAL REFLUX DISEASE WITH ESOPHAGITIS WITHOUT HEMORRHAGE: ICD-10-CM

## 2025-02-26 RX ORDER — LANSOPRAZOLE 30 MG/1
30 CAPSULE, DELAYED RELEASE ORAL 2 TIMES DAILY
Qty: 180 CAPSULE | Refills: 3 | Status: SHIPPED | OUTPATIENT
Start: 2025-02-26

## 2025-02-26 NOTE — TELEPHONE ENCOUNTER
Medication Requested Lansoprazole 30mg    Last Refill 2/15/2024    Last OV 10/14/2024    Next OV 10/14/2025    Medication pended for approval and correct pharmacy verified Yes

## 2025-04-09 NOTE — PROGRESS NOTES
"Chief Complaint: Prostatitis, unspecified prostatitis type and Difficulty Urinating (2 mo. F/u)      Subjective         History of Present Illness  Nabeel Fraser is a 44 y.o. male presents to [facility] UROLOGY to be seen for follow-up.    The patient was previously seen in the office on 1/29/2025 for prostatitis, dysuria and erectile dysfunction.  At that visit, the patient was started on a 4-week course of doxycycline.  At that visit the patient reported that he had stopped use of sildenafil because it was causing him to have itching to the throat and chest.  He was not interested in any additional treatment for ED at that time.  The patient is here today to follow-up.    He was only able to take 2 weeks of the doxycyline, because he got the flu and he could not keep down any food or drink so he stopped taking the medicine.  He reports that he did notice that his urinary symptoms seemed to improve while he was on the doxycycline.  Since he has stopped the medicine he has noticed that he is still having to urinate frequently, especially at night.  He reports that he is usually up 2-3 times per night needing to urinate.  He denies any dysuria, but does report pressure\" to the perineal area.  He reports that his erectile dysfunction issues were also improving while he was taking the doxycycline.  He is still taking tamsulosin.      Previous visit 1/29/2025:  Nabeel Fraser is a 44 y.o. male presents to [facility] UROLOGY to be seen for prostatitis.     He reports to the office today with complaints of left sided abdominal and left flank pain.  He first noticed the pain about 4-5 years ago.  He reports that at first he only had the pain if he drank a lot of dark colored sodas.  Over the last two years he started having more pain.  He decided to cut back on sodas and this has helped with the pain.  He reports that he has seen GI and had multiple different tests and there was not " "identified cause for his pain.       He reports that he does have some occasional burning with urination.  He recently switched from drinking mainly soda to mostly water and this has helped this.  He does have some mild burning with urination today, but did drink two sodas yesterday.  He does also report perineal pain and feels like he is sitting on a ball at times.  His PCP started him on tamsulosin 3 months ago and this has helped some with his urinary symptoms.  He reports that when he stops taking the medication that pain he has in his abdomen and flank seems to worsen.  He feels like he is emptying his bladder completely.        He has not been treated for a prostatitis.  He reports that he had a CT scan of his abdomen done about a year ago.  His PCP is monitoring his PSA.     He has noticed that he has had issues with ED over the last two years.  He has issues obtaining and maintaining an erection.  He tried sildenafil, but that caused itching of the throat and chest area so he stopped that medication.  He is not interested in any other treatments for ED at this time as he would like to sort through his other issues before he even thinks about treating his ED.     Frequency-denies      Urgency-admits      Incontinence-admits      Nocturia-admits, 1-4 X per night     Dysuria-admits      Perineal pain-admits, sometimes has pain, sometimes feels like he is sitting on a ball     Stream-normal, was weak before starting tamsulosin      GH-denies      History of stones-denies       surgeries-denies      Family history of  malignancy-prostate cancer-father-diagnosed around age 45, bladder cancer-father-diagnosed early 30's      Cardiopulmonary-HTN, asthma     Anticoagulants-denies      Smoker-denies      PSA  11/21/2024 0.6       Objective     Past Medical History:   Diagnosis Date    Anesthesia complication     Patient states \"had trouble breathing during previous colonoscopy.\"    Asthma     Degenerative disc " disease, cervical     Hyperlipidemia     Hypertension        Past Surgical History:   Procedure Laterality Date    COLONOSCOPY      COLONOSCOPY N/A 10/24/2023    Procedure: COLONOSCOPY with biopsy and cold snare polypecotmy;  Surgeon: Sarah Funez MD;  Location: [facility] ENDOSCOPY;  Service: Gastroenterology;  Laterality: N/A;  colon polyps, diverticulosis    ENDOSCOPY N/A 10/24/2023    Procedure: ESOPHAGOGASTRODUODENOSCOPY with biopsy;  Surgeon: Sarah Funez MD;  Location: [facility] ENDOSCOPY;  Service: Gastroenterology;  Laterality: N/A;  esophagitis, hiatal hernia    HEMORRHOIDECTOMY      TESTICLE UNDESCENDED REPAIR      UPPER GASTROINTESTINAL ENDOSCOPY           Current Outpatient Medications:     albuterol sulfate  (90 Base) MCG/ACT inhaler, Inhale 2 puffs Every 4 (Four) Hours As Needed., Disp: , Rfl:     atenolol-chlorthalidone (TENORETIC) 100-25 MG per tablet, Take 1 tablet by mouth Daily., Disp: , Rfl:     atorvastatin (LIPITOR) 20 MG tablet, Take 1 tablet by mouth every night at bedtime., Disp: , Rfl:     lansoprazole (PREVACID) 30 MG capsule, TAKE 1 CAPSULE BY MOUTH TWICE DAILY, Disp: 180 capsule, Rfl: 3    losartan (COZAAR) 50 MG tablet, Take 1 tablet by mouth Daily., Disp: , Rfl:     tamsulosin (FLOMAX) 0.4 MG capsule 24 hr capsule, TAKE 1 CAPSULE BY MOUTH EVERY DAY 30 MINS AFTER THE SAME MEAL EACH DAY, Disp: , Rfl:     doxycycline (VIBRAMYCIN) 100 MG capsule, Take 1 capsule by mouth 2 (Two) Times a Day for 28 days., Disp: 56 capsule, Rfl: 0    Allergies   Allergen Reactions    Cefdinir Unknown - High Severity    Contrast Dye (Echo Or Unknown Ct/Mr) Unknown - High Severity     Skin turns purple    Erythromycin Unknown - High Severity    Penicillins Unknown - High Severity    Iodinated Contrast Media Other (See Comments)     Pt says he was 13 or 14 when develepoed this allergy        Family History   Problem Relation Age of Onset    Stomach cancer Father     Colon cancer Father      "Multiple sclerosis Father     Heart disease Father     Diabetes Mother     Diabetes Paternal Grandmother     Heart disease Paternal Grandfather        Social History     Socioeconomic History    Marital status:    Tobacco Use    Smoking status: Former     Current packs/day: 0.00     Types: Cigarettes     Quit date: 2009     Years since quittin.2    Smokeless tobacco: Never   Vaping Use    Vaping status: Never Used   Substance and Sexual Activity    Alcohol use: Not Currently    Drug use: Defer    Sexual activity: Not Currently       Vital Signs:   Resp 14   Ht 195.6 cm (77\")   Wt 133 kg (294 lb)   BMI 34.86 kg/m²      Physical Exam  Vitals and nursing note reviewed.   Constitutional:       General: He is not in acute distress.     Appearance: Normal appearance. He is not toxic-appearing.   Pulmonary:      Effort: Pulmonary effort is normal. No respiratory distress.   Neurological:      General: No focal deficit present.      Mental Status: He is alert and oriented to person, place, and time.          Result Review :   The following data was reviewed by: FERNANDA Ivory on 2025:  Results for orders placed or performed in visit on 25   Bladder Scan    Collection Time: 25  8:52 AM   Result Value Ref Range    Urine Volume 0       Bladder Scan interpretation 2025    Estimation of residual urine via BVI 3000 Verathon Bladder Scan  MA/nurse performing: OPAL Quarles  Residual Urine: 0 ml  Indication: Prostatitis, unspecified prostatitis type    Erectile dysfunction, unspecified erectile dysfunction type    Benign prostatic hyperplasia with nocturia   Position: Supine  Examination: Incremental scanning of the suprapubic area using 2.0 MHz transducer using copious amounts of acoustic gel.   Findings: An anechoic area was demonstrated which represented the bladder, with measurement of residual urine as noted. I inspected this myself. In that the residual urine was stable or " insignificant, refer to plan for treatment and plan necessary at this time.               Procedures        Assessment and Plan    Diagnoses and all orders for this visit:    1. Prostatitis, unspecified prostatitis type (Primary)  -     Bladder Scan  -     doxycycline (VIBRAMYCIN) 100 MG capsule; Take 1 capsule by mouth 2 (Two) Times a Day for 28 days.  Dispense: 56 capsule; Refill: 0    2. Erectile dysfunction, unspecified erectile dysfunction type    3. Benign prostatic hyperplasia with nocturia      Given the fact that the patient was noticing some improvement in his urinary symptoms while on antibiotics it does make me believe there is a chance that he does have a prostatitis.  I think it is reasonable for us to repeat treatment with doxycycline for the full 4-week course to see if we can get his urinary symptoms better controlled.  Recommend that he does take a probiotic while on the doxycycline.  Continue use of tamsulosin.  The patient does not desire any additional treatment for ED at this time.    Will plan to see the patient back in the office in 6 weeks or sooner if needed for any worsening symptoms or new concerns.    Follow Up   Return in about 6 weeks (around 5/23/2025).  Patient was given instructions and counseling regarding his condition or for health maintenance advice. Please see specific information pulled into the AVS if appropriate.         This document has been electronically signed by FERNANDA Ivory  April 11, 2025 16:26 EDT

## 2025-04-11 ENCOUNTER — OFFICE VISIT (OUTPATIENT)
Dept: UROLOGY | Age: 45
End: 2025-04-11
Payer: COMMERCIAL

## 2025-04-11 VITALS — WEIGHT: 294 LBS | RESPIRATION RATE: 14 BRPM | HEIGHT: 77 IN | BODY MASS INDEX: 34.71 KG/M2

## 2025-04-11 DIAGNOSIS — N40.1 BENIGN PROSTATIC HYPERPLASIA WITH NOCTURIA: ICD-10-CM

## 2025-04-11 DIAGNOSIS — R35.1 BENIGN PROSTATIC HYPERPLASIA WITH NOCTURIA: ICD-10-CM

## 2025-04-11 DIAGNOSIS — N52.9 ERECTILE DYSFUNCTION, UNSPECIFIED ERECTILE DYSFUNCTION TYPE: ICD-10-CM

## 2025-04-11 DIAGNOSIS — N41.9 PROSTATITIS, UNSPECIFIED PROSTATITIS TYPE: Primary | ICD-10-CM

## 2025-04-11 LAB — URINE VOLUME: 0

## 2025-04-11 PROCEDURE — 1159F MED LIST DOCD IN RCRD: CPT | Performed by: NURSE PRACTITIONER

## 2025-04-11 PROCEDURE — 1160F RVW MEDS BY RX/DR IN RCRD: CPT | Performed by: NURSE PRACTITIONER

## 2025-04-11 PROCEDURE — 99213 OFFICE O/P EST LOW 20 MIN: CPT | Performed by: NURSE PRACTITIONER

## 2025-04-11 RX ORDER — DOXYCYCLINE 100 MG/1
100 CAPSULE ORAL 2 TIMES DAILY
Qty: 56 CAPSULE | Refills: 0 | Status: SHIPPED | OUTPATIENT
Start: 2025-04-11 | End: 2025-05-09

## 2025-04-28 ENCOUNTER — TELEPHONE (OUTPATIENT)
Dept: UROLOGY | Age: 45
End: 2025-04-28
Payer: COMMERCIAL

## 2025-04-28 DIAGNOSIS — N40.1 BENIGN PROSTATIC HYPERPLASIA WITH NOCTURIA: Primary | ICD-10-CM

## 2025-04-28 DIAGNOSIS — R35.1 BENIGN PROSTATIC HYPERPLASIA WITH NOCTURIA: Primary | ICD-10-CM

## 2025-04-28 RX ORDER — TAMSULOSIN HYDROCHLORIDE 0.4 MG/1
1 CAPSULE ORAL DAILY
Qty: 90 CAPSULE | Refills: 4 | Status: SHIPPED | OUTPATIENT
Start: 2025-04-28

## 2025-04-28 NOTE — TELEPHONE ENCOUNTER
PATIENT CALLED TO SEE IF YOU COULD SEND IN A SCRIPT FOR HIS TAMSULOSIN. HE HAS NOT BEEN ABLE TO REACH THE PROVIDER THAT ORDERED IT BUT SAID THAT YOU ADVISED HIM TO CONTINUE TAKING IT. HE ONLY HAS A FEW PILLS LEFT.

## 2025-04-29 ENCOUNTER — TELEPHONE (OUTPATIENT)
Dept: UROLOGY | Age: 45
End: 2025-04-29
Payer: COMMERCIAL

## 2025-05-21 NOTE — PROGRESS NOTES
Chief Complaint: No chief complaint on file.    Patient or patient representative verbalized consent for the use of Ambient Listening during the visit with  FERNANDA Ivory for chart documentation. 5/23/2025  08:46 EDT    Subjective         History of Present Illness  Nabeel Fraser is a 44 y.o. male presents to [facility] UROLOGY to be seen for follow-up.    The patient was most recently seen in the office on 4/11/2025 for prostatitis, BPH with nocturia and ED.  At that visit the patient was started on a 4-week course of doxycycline.  He was to continue tamsulosin.  He did not desire any treatment for ED at that time.  He is here today to follow-up.    He reports that he completed his antibiotics.. He reports that the antibiotics alleviated his urinary symptoms, especially perineal pain. However, he feels like the tamsulosin is not working as well as it once did.  He has observed a decrease in his urinary frequency and feels that he is unsure if he is able to fully empty his bladder. He reports no dysuria, hematuria, or pain in the prostate area. He also reports a decrease in urinary frequency compared to 8 months ago and a perceived weakness in his urine stream since his last medication refill. He does not feel like the tamsulosin is as effective as it once was. He used to wake up 1 to 4 times at night to urinate, but recently he has been able to sleep through the night without needing to urinate, even though his fluid intake remains the same.  Although he likes being able to sleep all night, he is concerned that he may be retaining urine.    He reports no changes or improvement in his erectile function. He has previously tried sildenafil, which caused throat itching. He maintains a strong libido and occasionally experiences difficulty achieving full erection. He also reports issues with focus and attention during sexual activity. He is interested in trying tadalafil.  I did caution the  "patient that if he was to have issues with the tadalafil including, but not limited to itching of the throat, he should stop the medication immediately and let me know.    MEDICATIONS  Current: Tamsulosin.  Past: Sildenafil.    Previous visit 4/11/2025:  Nabeel Fraser is a 44 y.o. male presents to [facility] UROLOGY to be seen for follow-up.     The patient was previously seen in the office on 1/29/2025 for prostatitis, dysuria and erectile dysfunction.  At that visit, the patient was started on a 4-week course of doxycycline.  At that visit the patient reported that he had stopped use of sildenafil because it was causing him to have itching to the throat and chest.  He was not interested in any additional treatment for ED at that time.  The patient is here today to follow-up.     He was only able to take 2 weeks of the doxycyline, because he got the flu and he could not keep down any food or drink so he stopped taking the medicine.  He reports that he did notice that his urinary symptoms seemed to improve while he was on the doxycycline.  Since he has stopped the medicine he has noticed that he is still having to urinate frequently, especially at night.  He reports that he is usually up 2-3 times per night needing to urinate.  He denies any dysuria, but does report pressure\" to the perineal area.  He reports that his erectile dysfunction issues were also improving while he was taking the doxycycline.  He is still taking tamsulosin.       Previous visit 1/29/2025:  Nabeel Fraser is a 44 y.o. male presents to [facility] UROLOGY to be seen for prostatitis.     He reports to the office today with complaints of left sided abdominal and left flank pain.  He first noticed the pain about 4-5 years ago.  He reports that at first he only had the pain if he drank a lot of dark colored sodas.  Over the last two years he started having more pain.  He decided to cut back on sodas and this " has helped with the pain.  He reports that he has seen GI and had multiple different tests and there was not identified cause for his pain.       He reports that he does have some occasional burning with urination.  He recently switched from drinking mainly soda to mostly water and this has helped this.  He does have some mild burning with urination today, but did drink two sodas yesterday.  He does also report perineal pain and feels like he is sitting on a ball at times.  His PCP started him on tamsulosin 3 months ago and this has helped some with his urinary symptoms.  He reports that when he stops taking the medication that pain he has in his abdomen and flank seems to worsen.  He feels like he is emptying his bladder completely.        He has not been treated for a prostatitis.  He reports that he had a CT scan of his abdomen done about a year ago.  His PCP is monitoring his PSA.     He has noticed that he has had issues with ED over the last two years.  He has issues obtaining and maintaining an erection.  He tried sildenafil, but that caused itching of the throat and chest area so he stopped that medication.  He is not interested in any other treatments for ED at this time as he would like to sort through his other issues before he even thinks about treating his ED.     Frequency-denies      Urgency-admits      Incontinence-admits      Nocturia-admits, 1-4 X per night     Dysuria-admits      Perineal pain-admits, sometimes has pain, sometimes feels like he is sitting on a ball     Stream-normal, was weak before starting tamsulosin      GH-denies      History of stones-denies       surgeries-denies      Family history of  malignancy-prostate cancer-father-diagnosed around age 45, bladder cancer-father-diagnosed early 30's      Cardiopulmonary-HTN, asthma     Anticoagulants-denies      Smoker-denies      PSA  11/21/2024 0.6    Objective     Past Medical History:   Diagnosis Date    Anesthesia complication   "   Patient states \"had trouble breathing during previous colonoscopy.\"    Asthma     Degenerative disc disease, cervical     Hyperlipidemia     Hypertension        Past Surgical History:   Procedure Laterality Date    COLONOSCOPY      COLONOSCOPY N/A 10/24/2023    Procedure: COLONOSCOPY with biopsy and cold snare polypecotmy;  Surgeon: Sraah Funez MD;  Location: [facility] ENDOSCOPY;  Service: Gastroenterology;  Laterality: N/A;  colon polyps, diverticulosis    ENDOSCOPY N/A 10/24/2023    Procedure: ESOPHAGOGASTRODUODENOSCOPY with biopsy;  Surgeon: Sarah Funez MD;  Location: [facility] ENDOSCOPY;  Service: Gastroenterology;  Laterality: N/A;  esophagitis, hiatal hernia    HEMORRHOIDECTOMY      TESTICLE UNDESCENDED REPAIR      UPPER GASTROINTESTINAL ENDOSCOPY           Current Outpatient Medications:     albuterol sulfate  (90 Base) MCG/ACT inhaler, Inhale 2 puffs Every 4 (Four) Hours As Needed., Disp: , Rfl:     atenolol-chlorthalidone (TENORETIC) 100-25 MG per tablet, Take 1 tablet by mouth Daily., Disp: , Rfl:     atorvastatin (LIPITOR) 20 MG tablet, Take 1 tablet by mouth every night at bedtime., Disp: , Rfl:     lansoprazole (PREVACID) 30 MG capsule, TAKE 1 CAPSULE BY MOUTH TWICE DAILY, Disp: 180 capsule, Rfl: 3    losartan (COZAAR) 50 MG tablet, Take 1 tablet by mouth Daily., Disp: , Rfl:     tadalafil (CIALIS) 5 MG tablet, Take 1-4 tablets by mouth Daily As Needed for Erectile Dysfunction., Disp: 30 tablet, Rfl: 11    tamsulosin (FLOMAX) 0.4 MG capsule 24 hr capsule, Take 2 capsules by mouth Daily., Disp: 180 capsule, Rfl: 4    Allergies   Allergen Reactions    Cefdinir Unknown - High Severity    Contrast Dye (Echo Or Unknown Ct/Mr) Unknown - High Severity     Skin turns purple    Erythromycin Unknown - High Severity    Penicillins Unknown - High Severity    Iodinated Contrast Media Other (See Comments)     Pt says he was 13 or 14 when develepoed this allergy        Family History " "  Problem Relation Age of Onset    Stomach cancer Father     Colon cancer Father     Multiple sclerosis Father     Heart disease Father     Diabetes Mother     Diabetes Paternal Grandmother     Heart disease Paternal Grandfather        Social History     Socioeconomic History    Marital status:    Tobacco Use    Smoking status: Former     Current packs/day: 0.00     Types: Cigarettes     Quit date: 2009     Years since quittin.4    Smokeless tobacco: Never   Vaping Use    Vaping status: Never Used   Substance and Sexual Activity    Alcohol use: Not Currently    Drug use: Defer    Sexual activity: Not Currently       Vital Signs:   Resp 18   Ht 195.6 cm (77\")   Wt 132 kg (290 lb)   BMI 34.39 kg/m²      Physical Exam  Vitals and nursing note reviewed.   Constitutional:       General: He is not in acute distress.     Appearance: Normal appearance. He is not toxic-appearing.   Pulmonary:      Effort: Pulmonary effort is normal. No respiratory distress.   Neurological:      General: No focal deficit present.      Mental Status: He is alert and oriented to person, place, and time.          Result Review :   The following data was reviewed by: FERNANDA Ivory on 2025:  Results for orders placed or performed in visit on 25   Bladder Scan    Collection Time: 25  8:26 AM   Result Value Ref Range    Volume 105 ml       Bladder Scan interpretation 2025    Estimation of residual urine via BVI 3000 Verathon Bladder Scan  MA/nurse performing: IZABELLA Hurst  Residual Urine: 105 ml  Indication: Benign prostatic hyperplasia with nocturia    Erectile dysfunction, unspecified erectile dysfunction type   Position: Supine  Examination: Incremental scanning of the suprapubic area using 2.0 MHz transducer using copious amounts of acoustic gel.   Findings: An anechoic area was demonstrated which represented the bladder, with measurement of residual urine as noted. I inspected this myself. In that " the residual urine was increased comparison to previous PVRs, refer to plan for treatment and plan necessary at this time.         Results  Laboratory Studies  Residual urine in bladder was 105 mL.        Procedures        Assessment and Plan    Diagnoses and all orders for this visit:    1. Benign prostatic hyperplasia with nocturia (Primary)  -     Bladder Scan  -     tamsulosin (FLOMAX) 0.4 MG capsule 24 hr capsule; Take 2 capsules by mouth Daily.  Dispense: 180 capsule; Refill: 4    2. Erectile dysfunction, unspecified erectile dysfunction type  -     tadalafil (CIALIS) 5 MG tablet; Take 1-4 tablets by mouth Daily As Needed for Erectile Dysfunction.  Dispense: 30 tablet; Refill: 11        Assessment & Plan  1. Benign prostatic hyperplasia.  He completed his antibiotic course and reported some improvement in urinary symptoms.  He is not out of the prostatitis at this time.  However, a recent bladder scan showed 105 mL of residual urine, indicating incomplete bladder emptying. This is concerning as previous scans showed no residual urine. Increasing the tamsulosin dosage to 2 capsules daily is recommended to improve bladder emptying. He can take both capsules simultaneously or split them between morning and night. A prescription for 180 capsules with 4 refills will be sent to his pharmacy. He is advised to monitor his symptoms closely and ensure adequate hydration. If he experiences decreased urine output or a sensation of a full bladder, he should contact the office immediately.    2. Erectile dysfunction.  He reported an adverse reaction to sildenafil (itchy throat) and is wanting to try Cialis (tadalafil). He is advised to start with a low dose of 5 mg and increase up to 20 mg as tolerated. A prescription for Cialis will be sent to his pharmacy. He is informed that an erection lasting more than 4 hours requires immediate medical attention at the emergency room. A [employer] card will be provided to help with the  cost of the medication.    Follow-up  The patient will follow up in 3 months or sooner if needed for any worsening symptoms or new concerns.      Follow Up   Return in about 3 months (around 8/23/2025).  Patient was given instructions and counseling regarding his condition or for health maintenance advice. Please see specific information pulled into the AVS if appropriate.         This document has been electronically signed by FERNANDA Ivory  May 23, 2025 09:08 EDT

## 2025-05-23 ENCOUNTER — OFFICE VISIT (OUTPATIENT)
Dept: UROLOGY | Age: 45
End: 2025-05-23
Payer: COMMERCIAL

## 2025-05-23 VITALS — HEIGHT: 77 IN | WEIGHT: 290 LBS | BODY MASS INDEX: 34.24 KG/M2 | RESPIRATION RATE: 18 BRPM

## 2025-05-23 DIAGNOSIS — N40.1 BENIGN PROSTATIC HYPERPLASIA WITH NOCTURIA: Primary | ICD-10-CM

## 2025-05-23 DIAGNOSIS — R35.1 BENIGN PROSTATIC HYPERPLASIA WITH NOCTURIA: Primary | ICD-10-CM

## 2025-05-23 DIAGNOSIS — N52.9 ERECTILE DYSFUNCTION, UNSPECIFIED ERECTILE DYSFUNCTION TYPE: ICD-10-CM

## 2025-05-23 LAB — SPECIMEN VOL 24H UR: NORMAL L

## 2025-05-23 PROCEDURE — 1159F MED LIST DOCD IN RCRD: CPT | Performed by: NURSE PRACTITIONER

## 2025-05-23 PROCEDURE — 1160F RVW MEDS BY RX/DR IN RCRD: CPT | Performed by: NURSE PRACTITIONER

## 2025-05-23 PROCEDURE — 99213 OFFICE O/P EST LOW 20 MIN: CPT | Performed by: NURSE PRACTITIONER

## 2025-05-23 RX ORDER — TAMSULOSIN HYDROCHLORIDE 0.4 MG/1
2 CAPSULE ORAL DAILY
Qty: 180 CAPSULE | Refills: 4 | Status: SHIPPED | OUTPATIENT
Start: 2025-05-23

## 2025-05-23 RX ORDER — TADALAFIL 5 MG/1
5-20 TABLET ORAL DAILY PRN
Qty: 30 TABLET | Refills: 11 | Status: SHIPPED | OUTPATIENT
Start: 2025-05-23

## 2025-08-22 ENCOUNTER — OFFICE VISIT (OUTPATIENT)
Dept: UROLOGY | Age: 45
End: 2025-08-22
Payer: COMMERCIAL

## 2025-08-22 VITALS — BODY MASS INDEX: 34.24 KG/M2 | RESPIRATION RATE: 12 BRPM | WEIGHT: 290 LBS | HEIGHT: 77 IN

## 2025-08-22 DIAGNOSIS — Z12.5 PROSTATE CANCER SCREENING: ICD-10-CM

## 2025-08-22 DIAGNOSIS — R35.1 BENIGN PROSTATIC HYPERPLASIA WITH NOCTURIA: Primary | ICD-10-CM

## 2025-08-22 DIAGNOSIS — N52.9 ERECTILE DYSFUNCTION, UNSPECIFIED ERECTILE DYSFUNCTION TYPE: ICD-10-CM

## 2025-08-22 DIAGNOSIS — N40.1 BENIGN PROSTATIC HYPERPLASIA WITH NOCTURIA: Primary | ICD-10-CM

## 2025-08-22 LAB — URINE VOLUME: 0

## 2025-08-22 PROCEDURE — 1160F RVW MEDS BY RX/DR IN RCRD: CPT | Performed by: NURSE PRACTITIONER

## 2025-08-22 PROCEDURE — 99213 OFFICE O/P EST LOW 20 MIN: CPT | Performed by: NURSE PRACTITIONER

## 2025-08-22 PROCEDURE — 1159F MED LIST DOCD IN RCRD: CPT | Performed by: NURSE PRACTITIONER

## (undated) DEVICE — BLCK/BITE BLOX WO/DENTL/RIM W/STRAP 54F

## (undated) DEVICE — Device: Brand: DEFENDO AIR/WATER/SUCTION AND BIOPSY VALVE

## (undated) DEVICE — LINER SURG CANSTR SXN S/RIGD 1500CC

## (undated) DEVICE — SOL IRRG H2O PL/BG 1000ML STRL

## (undated) DEVICE — SINGLE-USE BIOPSY FORCEPS: Brand: RADIAL JAW 4

## (undated) DEVICE — SOLIDIFIER LIQLOC PLS 1500CC BT

## (undated) DEVICE — CONN JET HYDRA H20 AUXILIARY DISP

## (undated) DEVICE — Device